# Patient Record
Sex: FEMALE | Race: ASIAN | URBAN - METROPOLITAN AREA
[De-identification: names, ages, dates, MRNs, and addresses within clinical notes are randomized per-mention and may not be internally consistent; named-entity substitution may affect disease eponyms.]

---

## 2023-08-14 ENCOUNTER — OFFICE VISIT (OUTPATIENT)
Dept: INTERNAL MEDICINE CLINIC | Facility: CLINIC | Age: 33
End: 2023-08-14
Payer: MEDICAID

## 2023-08-14 VITALS
HEART RATE: 66 BPM | DIASTOLIC BLOOD PRESSURE: 64 MMHG | SYSTOLIC BLOOD PRESSURE: 102 MMHG | RESPIRATION RATE: 18 BRPM | OXYGEN SATURATION: 98 % | HEIGHT: 66.14 IN | TEMPERATURE: 97 F | WEIGHT: 177.38 LBS | BODY MASS INDEX: 28.51 KG/M2

## 2023-08-14 DIAGNOSIS — Z12.4 SCREENING FOR CERVICAL CANCER: ICD-10-CM

## 2023-08-14 DIAGNOSIS — Z13.0 SCREENING FOR DEFICIENCY ANEMIA: ICD-10-CM

## 2023-08-14 DIAGNOSIS — Z13.220 SCREENING FOR LIPID DISORDERS: ICD-10-CM

## 2023-08-14 DIAGNOSIS — Z13.228 SCREENING FOR METABOLIC DISORDER: ICD-10-CM

## 2023-08-14 DIAGNOSIS — B18.1 HEPATITIS B CARRIER (HCC): Primary | ICD-10-CM

## 2023-08-14 DIAGNOSIS — Z13.29 SCREENING FOR THYROID DISORDER: ICD-10-CM

## 2023-08-14 PROCEDURE — 99203 OFFICE O/P NEW LOW 30 MIN: CPT | Performed by: FAMILY MEDICINE

## 2023-08-14 PROCEDURE — 3078F DIAST BP <80 MM HG: CPT | Performed by: FAMILY MEDICINE

## 2023-08-14 PROCEDURE — 3008F BODY MASS INDEX DOCD: CPT | Performed by: FAMILY MEDICINE

## 2023-08-14 PROCEDURE — 3074F SYST BP LT 130 MM HG: CPT | Performed by: FAMILY MEDICINE

## 2023-08-15 ENCOUNTER — TELEPHONE (OUTPATIENT)
Dept: INTERNAL MEDICINE CLINIC | Facility: CLINIC | Age: 33
End: 2023-08-15

## 2023-09-01 ENCOUNTER — LAB ENCOUNTER (OUTPATIENT)
Dept: LAB | Age: 33
End: 2023-09-01
Attending: FAMILY MEDICINE
Payer: MEDICAID

## 2023-09-01 DIAGNOSIS — Z13.220 SCREENING FOR LIPID DISORDERS: ICD-10-CM

## 2023-09-01 DIAGNOSIS — Z13.228 SCREENING FOR METABOLIC DISORDER: ICD-10-CM

## 2023-09-01 DIAGNOSIS — Z13.0 SCREENING FOR DEFICIENCY ANEMIA: ICD-10-CM

## 2023-09-01 DIAGNOSIS — B18.1 HEPATITIS B CARRIER (HCC): ICD-10-CM

## 2023-09-01 DIAGNOSIS — Z13.29 SCREENING FOR THYROID DISORDER: ICD-10-CM

## 2023-09-01 LAB
ALBUMIN SERPL-MCNC: 4 G/DL (ref 3.4–5)
ALBUMIN/GLOB SERPL: 1.1 {RATIO} (ref 1–2)
ALP LIVER SERPL-CCNC: 50 U/L
ALT SERPL-CCNC: 33 U/L
ANION GAP SERPL CALC-SCNC: 8 MMOL/L (ref 0–18)
AST SERPL-CCNC: 15 U/L (ref 15–37)
BASOPHILS # BLD AUTO: 0.03 X10(3) UL (ref 0–0.2)
BASOPHILS NFR BLD AUTO: 0.4 %
BILIRUB SERPL-MCNC: 0.6 MG/DL (ref 0.1–2)
BUN BLD-MCNC: 11 MG/DL (ref 7–18)
CALCIUM BLD-MCNC: 9.1 MG/DL (ref 8.5–10.1)
CHLORIDE SERPL-SCNC: 109 MMOL/L (ref 98–112)
CHOLEST SERPL-MCNC: 148 MG/DL (ref ?–200)
CO2 SERPL-SCNC: 23 MMOL/L (ref 21–32)
CREAT BLD-MCNC: 0.73 MG/DL
EGFRCR SERPLBLD CKD-EPI 2021: 112 ML/MIN/1.73M2 (ref 60–?)
EOSINOPHIL # BLD AUTO: 0.18 X10(3) UL (ref 0–0.7)
EOSINOPHIL NFR BLD AUTO: 2.2 %
ERYTHROCYTE [DISTWIDTH] IN BLOOD BY AUTOMATED COUNT: 12.9 %
FASTING PATIENT LIPID ANSWER: YES
FASTING STATUS PATIENT QL REPORTED: YES
GLOBULIN PLAS-MCNC: 3.5 G/DL (ref 2.8–4.4)
GLUCOSE BLD-MCNC: 96 MG/DL (ref 70–99)
HBV CORE AB SERPL QL IA: REACTIVE
HBV CORE IGM SER QL: NONREACTIVE
HBV SURFACE AB SER QL: NONREACTIVE
HBV SURFACE AB SERPL IA-ACNC: <3.1 MIU/ML
HBV SURFACE AG SER-ACNC: >1000 [IU]/L
HBV SURFACE AG SERPL QL IA: REACTIVE
HCT VFR BLD AUTO: 42.3 %
HDLC SERPL-MCNC: 35 MG/DL (ref 40–59)
HGB BLD-MCNC: 13.6 G/DL
IMM GRANULOCYTES # BLD AUTO: 0.04 X10(3) UL (ref 0–1)
IMM GRANULOCYTES NFR BLD: 0.5 %
LDLC SERPL CALC-MCNC: 92 MG/DL (ref ?–100)
LYMPHOCYTES # BLD AUTO: 1.87 X10(3) UL (ref 1–4)
LYMPHOCYTES NFR BLD AUTO: 23.1 %
MCH RBC QN AUTO: 28.9 PG (ref 26–34)
MCHC RBC AUTO-ENTMCNC: 32.2 G/DL (ref 31–37)
MCV RBC AUTO: 90 FL
MONOCYTES # BLD AUTO: 0.47 X10(3) UL (ref 0.1–1)
MONOCYTES NFR BLD AUTO: 5.8 %
NEUTROPHILS # BLD AUTO: 5.51 X10 (3) UL (ref 1.5–7.7)
NEUTROPHILS # BLD AUTO: 5.51 X10(3) UL (ref 1.5–7.7)
NEUTROPHILS NFR BLD AUTO: 68 %
NONHDLC SERPL-MCNC: 113 MG/DL (ref ?–130)
OSMOLALITY SERPL CALC.SUM OF ELEC: 289 MOSM/KG (ref 275–295)
PLATELET # BLD AUTO: 288 10(3)UL (ref 150–450)
POTASSIUM SERPL-SCNC: 4 MMOL/L (ref 3.5–5.1)
PROT SERPL-MCNC: 7.5 G/DL (ref 6.4–8.2)
RBC # BLD AUTO: 4.7 X10(6)UL
SODIUM SERPL-SCNC: 140 MMOL/L (ref 136–145)
TRIGL SERPL-MCNC: 113 MG/DL (ref 30–149)
TSI SER-ACNC: 1.69 MIU/ML (ref 0.36–3.74)
VLDLC SERPL CALC-MCNC: 18 MG/DL (ref 0–30)
WBC # BLD AUTO: 8.1 X10(3) UL (ref 4–11)

## 2023-09-01 PROCEDURE — 80053 COMPREHEN METABOLIC PANEL: CPT

## 2023-09-01 PROCEDURE — 36415 COLL VENOUS BLD VENIPUNCTURE: CPT

## 2023-09-01 PROCEDURE — 85025 COMPLETE CBC W/AUTO DIFF WBC: CPT

## 2023-09-01 PROCEDURE — 87517 HEPATITIS B DNA QUANT: CPT

## 2023-09-01 PROCEDURE — 86704 HEP B CORE ANTIBODY TOTAL: CPT

## 2023-09-01 PROCEDURE — 86705 HEP B CORE ANTIBODY IGM: CPT

## 2023-09-01 PROCEDURE — 80061 LIPID PANEL: CPT

## 2023-09-01 PROCEDURE — 87340 HEPATITIS B SURFACE AG IA: CPT

## 2023-09-01 PROCEDURE — 84443 ASSAY THYROID STIM HORMONE: CPT

## 2023-09-01 PROCEDURE — 86706 HEP B SURFACE ANTIBODY: CPT

## 2023-09-03 LAB
HBV IU/ML  2: 990 IU/ML
LOG10 HBV IU/ML: 3 LOG10 IU/ML

## 2023-09-11 ENCOUNTER — OFFICE VISIT (OUTPATIENT)
Dept: INTERNAL MEDICINE CLINIC | Facility: CLINIC | Age: 33
End: 2023-09-11
Payer: MEDICAID

## 2023-09-11 VITALS
BODY MASS INDEX: 28.13 KG/M2 | SYSTOLIC BLOOD PRESSURE: 102 MMHG | DIASTOLIC BLOOD PRESSURE: 74 MMHG | TEMPERATURE: 98 F | WEIGHT: 175 LBS | HEIGHT: 66 IN | HEART RATE: 76 BPM

## 2023-09-11 DIAGNOSIS — Z00.00 WELLNESS EXAMINATION: Primary | ICD-10-CM

## 2023-09-11 DIAGNOSIS — Z12.4 SCREENING FOR CERVICAL CANCER: ICD-10-CM

## 2023-09-11 DIAGNOSIS — B18.1 HEPATITIS B CARRIER (HCC): ICD-10-CM

## 2023-09-11 PROCEDURE — 99395 PREV VISIT EST AGE 18-39: CPT | Performed by: FAMILY MEDICINE

## 2023-09-11 PROCEDURE — 3078F DIAST BP <80 MM HG: CPT | Performed by: FAMILY MEDICINE

## 2023-09-11 PROCEDURE — 90715 TDAP VACCINE 7 YRS/> IM: CPT | Performed by: FAMILY MEDICINE

## 2023-09-11 PROCEDURE — 3074F SYST BP LT 130 MM HG: CPT | Performed by: FAMILY MEDICINE

## 2023-09-11 PROCEDURE — 3008F BODY MASS INDEX DOCD: CPT | Performed by: FAMILY MEDICINE

## 2023-09-11 PROCEDURE — 90471 IMMUNIZATION ADMIN: CPT | Performed by: FAMILY MEDICINE

## 2023-10-30 ENCOUNTER — HOSPITAL ENCOUNTER (OUTPATIENT)
Dept: ULTRASOUND IMAGING | Facility: HOSPITAL | Age: 33
Discharge: HOME | End: 2023-10-30
Attending: FAMILY MEDICINE
Payer: MEDICAID

## 2023-10-30 DIAGNOSIS — B18.1 HEPATITIS B CARRIER (HCC): ICD-10-CM

## 2023-10-30 PROCEDURE — 76700 US EXAM ABDOM COMPLETE: CPT | Performed by: FAMILY MEDICINE

## 2023-11-08 ENCOUNTER — OFFICE VISIT (OUTPATIENT)
Dept: OBGYN CLINIC | Facility: CLINIC | Age: 33
End: 2023-11-08
Payer: MEDICAID

## 2023-11-08 ENCOUNTER — LAB ENCOUNTER (OUTPATIENT)
Dept: LAB | Age: 33
End: 2023-11-08
Attending: STUDENT IN AN ORGANIZED HEALTH CARE EDUCATION/TRAINING PROGRAM
Payer: MEDICAID

## 2023-11-08 VITALS
SYSTOLIC BLOOD PRESSURE: 102 MMHG | BODY MASS INDEX: 28 KG/M2 | DIASTOLIC BLOOD PRESSURE: 80 MMHG | HEART RATE: 85 BPM | WEIGHT: 173.38 LBS

## 2023-11-08 DIAGNOSIS — N93.9 ABNORMAL UTERINE BLEEDING (AUB): ICD-10-CM

## 2023-11-08 DIAGNOSIS — Z01.419 WELL WOMAN EXAM WITH ROUTINE GYNECOLOGICAL EXAM: ICD-10-CM

## 2023-11-08 DIAGNOSIS — Z12.4 CERVICAL CANCER SCREENING: ICD-10-CM

## 2023-11-08 DIAGNOSIS — N39.3 STRESS INCONTINENCE: ICD-10-CM

## 2023-11-08 DIAGNOSIS — N93.9 ABNORMAL UTERINE BLEEDING (AUB): Primary | ICD-10-CM

## 2023-11-08 LAB
BASOPHILS # BLD AUTO: 0.03 X10(3) UL (ref 0–0.2)
BASOPHILS NFR BLD AUTO: 0.5 %
EOSINOPHIL # BLD AUTO: 0.25 X10(3) UL (ref 0–0.7)
EOSINOPHIL NFR BLD AUTO: 4 %
ERYTHROCYTE [DISTWIDTH] IN BLOOD BY AUTOMATED COUNT: 12.5 %
HCT VFR BLD AUTO: 41.1 %
HGB BLD-MCNC: 13.7 G/DL
IMM GRANULOCYTES # BLD AUTO: 0.02 X10(3) UL (ref 0–1)
IMM GRANULOCYTES NFR BLD: 0.3 %
LYMPHOCYTES # BLD AUTO: 1.96 X10(3) UL (ref 1–4)
LYMPHOCYTES NFR BLD AUTO: 31.3 %
MCH RBC QN AUTO: 29 PG (ref 26–34)
MCHC RBC AUTO-ENTMCNC: 33.3 G/DL (ref 31–37)
MCV RBC AUTO: 87.1 FL
MONOCYTES # BLD AUTO: 0.55 X10(3) UL (ref 0.1–1)
MONOCYTES NFR BLD AUTO: 8.8 %
NEUTROPHILS # BLD AUTO: 3.46 X10 (3) UL (ref 1.5–7.7)
NEUTROPHILS # BLD AUTO: 3.46 X10(3) UL (ref 1.5–7.7)
NEUTROPHILS NFR BLD AUTO: 55.1 %
PLATELET # BLD AUTO: 271 10(3)UL (ref 150–450)
RBC # BLD AUTO: 4.72 X10(6)UL
TSI SER-ACNC: 1.18 MIU/ML (ref 0.36–3.74)
WBC # BLD AUTO: 6.3 X10(3) UL (ref 4–11)

## 2023-11-08 PROCEDURE — 88175 CYTOPATH C/V AUTO FLUID REDO: CPT | Performed by: STUDENT IN AN ORGANIZED HEALTH CARE EDUCATION/TRAINING PROGRAM

## 2023-11-08 PROCEDURE — 87491 CHLMYD TRACH DNA AMP PROBE: CPT | Performed by: STUDENT IN AN ORGANIZED HEALTH CARE EDUCATION/TRAINING PROGRAM

## 2023-11-08 PROCEDURE — 3074F SYST BP LT 130 MM HG: CPT | Performed by: STUDENT IN AN ORGANIZED HEALTH CARE EDUCATION/TRAINING PROGRAM

## 2023-11-08 PROCEDURE — 36415 COLL VENOUS BLD VENIPUNCTURE: CPT

## 2023-11-08 PROCEDURE — 87591 N.GONORRHOEAE DNA AMP PROB: CPT | Performed by: STUDENT IN AN ORGANIZED HEALTH CARE EDUCATION/TRAINING PROGRAM

## 2023-11-08 PROCEDURE — 85025 COMPLETE CBC W/AUTO DIFF WBC: CPT

## 2023-11-08 PROCEDURE — 3079F DIAST BP 80-89 MM HG: CPT | Performed by: STUDENT IN AN ORGANIZED HEALTH CARE EDUCATION/TRAINING PROGRAM

## 2023-11-08 PROCEDURE — 99385 PREV VISIT NEW AGE 18-39: CPT | Performed by: STUDENT IN AN ORGANIZED HEALTH CARE EDUCATION/TRAINING PROGRAM

## 2023-11-08 PROCEDURE — 87624 HPV HI-RISK TYP POOLED RSLT: CPT | Performed by: STUDENT IN AN ORGANIZED HEALTH CARE EDUCATION/TRAINING PROGRAM

## 2023-11-08 PROCEDURE — 84443 ASSAY THYROID STIM HORMONE: CPT

## 2023-11-08 NOTE — PATIENT INSTRUCTIONS
To schedule an appointment for your radiology test [facility] at [phone].

## 2023-11-09 LAB
C TRACH DNA SPEC QL NAA+PROBE: NEGATIVE
HPV I/H RISK 1 DNA SPEC QL NAA+PROBE: NEGATIVE
N GONORRHOEA DNA SPEC QL NAA+PROBE: NEGATIVE

## 2023-11-14 LAB
.: NORMAL
.: NORMAL

## 2023-11-29 ENCOUNTER — HOSPITAL ENCOUNTER (OUTPATIENT)
Dept: ULTRASOUND IMAGING | Age: 33
Discharge: HOME | End: 2023-11-29
Attending: STUDENT IN AN ORGANIZED HEALTH CARE EDUCATION/TRAINING PROGRAM
Payer: MEDICAID

## 2023-11-29 DIAGNOSIS — N93.9 ABNORMAL UTERINE BLEEDING (AUB): ICD-10-CM

## 2023-11-29 PROCEDURE — 76856 US EXAM PELVIC COMPLETE: CPT | Performed by: STUDENT IN AN ORGANIZED HEALTH CARE EDUCATION/TRAINING PROGRAM

## 2023-11-29 PROCEDURE — 76830 TRANSVAGINAL US NON-OB: CPT | Performed by: STUDENT IN AN ORGANIZED HEALTH CARE EDUCATION/TRAINING PROGRAM

## 2024-02-23 ENCOUNTER — OFFICE VISIT (OUTPATIENT)
Dept: FAMILY MEDICINE CLINIC | Facility: CLINIC | Age: 34
End: 2024-02-23
Payer: MEDICAID

## 2024-02-23 VITALS
HEIGHT: 66 IN | RESPIRATION RATE: 16 BRPM | HEART RATE: 86 BPM | OXYGEN SATURATION: 98 % | TEMPERATURE: 98 F | WEIGHT: 180 LBS | BODY MASS INDEX: 28.93 KG/M2 | SYSTOLIC BLOOD PRESSURE: 120 MMHG | DIASTOLIC BLOOD PRESSURE: 86 MMHG

## 2024-02-23 DIAGNOSIS — J02.0 STREP THROAT: Primary | ICD-10-CM

## 2024-02-23 LAB
CONTROL LINE PRESENT WITH A CLEAR BACKGROUND (YES/NO): YES YES/NO
STREP GRP A CUL-SCR: POSITIVE

## 2024-02-23 RX ORDER — SPIRONOLACTONE 50 MG/1
50 TABLET, FILM COATED ORAL 2 TIMES DAILY
COMMUNITY
Start: 2024-02-02

## 2024-02-23 RX ORDER — AMOXICILLIN 500 MG/1
500 TABLET, FILM COATED ORAL 2 TIMES DAILY
Qty: 20 TABLET | Refills: 0 | Status: SHIPPED | OUTPATIENT
Start: 2024-02-23 | End: 2024-03-04

## 2024-02-23 NOTE — PROGRESS NOTES
CHIEF COMPLAINT:     Chief Complaint   Patient presents with    Sore Throat     Entered by patient x few days plus this morning coughed up yellow mucous       HPI:     Meena Vasques is a 33 year old female presents to clinic with symptoms of sore throat. Patient has had for 3 days. Symptoms have worsened since onset.  Patient reports following associated symptoms:  body felt feverish, body aches, hurts to swallow.  Jhony chills, headache, stomach upset, rash, congestion, cough, ear pain.    Treating symptoms with: tylenol/motrin, last dose 630am.   Has no history of strep. + sick contacts at home -  with similar symptoms.     Current Outpatient Medications   Medication Sig Dispense Refill    spironolactone 50 MG Oral Tab Take 1 tablet (50 mg total) by mouth 2 (two) times daily.      amoxicillin 500 MG Oral Tab Take 1 tablet (500 mg total) by mouth 2 (two) times daily for 10 days. 20 tablet 0      History reviewed. No pertinent past medical history.   Social History:  Social History     Socioeconomic History    Marital status:    Tobacco Use    Smoking status: Never     Passive exposure: Never    Smokeless tobacco: Never   Vaping Use    Vaping Use: Never used   Substance and Sexual Activity    Alcohol use: Not Currently    Drug use: Never    Sexual activity: Yes     Partners: Male     Birth control/protection: Condom   Other Topics Concern    Caffeine Concern Yes    Exercise No    Seat Belt Yes    Special Diet Yes        REVIEW OF SYSTEMS:   GENERAL HEALTH:  See HPI  SKIN: denies any unusual skin lesions or rashes  HEENT: See HPI  RESPIRATORY: denies shortness of breath, or wheezing  CARDIOVASCULAR: denies chest pain, palpitations   GI: denies abdominal pain, vomiting, constipation and diarrhea. normal appetite  NEURO: denies dizziness or lightheadedness      EXAM:   /86   Pulse 86   Temp 98.3 °F (36.8 °C) (Oral)   Resp 16   Ht 5' 6\" (1.676 m)   Wt 180 lb (81.6 kg)   LMP 02/01/2024  (Approximate)   SpO2 98%   BMI 29.05 kg/m²     Physical Exam  Constitutional:       General: She is not in acute distress.     Appearance: Normal appearance.   HENT:      Head: Normocephalic and atraumatic.      Right Ear: Tympanic membrane and ear canal normal.      Left Ear: Tympanic membrane and ear canal normal.      Nose: Nose normal.      Mouth/Throat:      Mouth: Mucous membranes are moist.      Pharynx: Oropharynx is clear. Uvula midline. Posterior oropharyngeal erythema present. Oropharyngeal exudate: no uvular deviation.     Tonsils: No tonsillar exudate.   Eyes:      Extraocular Movements: Extraocular movements intact.      Conjunctiva/sclera: Conjunctivae normal.   Cardiovascular:      Rate and Rhythm: Normal rate and regular rhythm.      Heart sounds: Normal heart sounds. No murmur heard.  Pulmonary:      Effort: Pulmonary effort is normal.      Breath sounds: Normal breath sounds.   Musculoskeletal:      Cervical back: Normal range of motion and neck supple.   Lymphadenopathy:      Cervical: Cervical adenopathy (non-tender) present.      Right cervical: Superficial cervical adenopathy present. No posterior cervical adenopathy.     Left cervical: Superficial cervical adenopathy present. No posterior cervical adenopathy.   Skin:     General: Skin is warm and dry.      Findings: No rash.   Neurological:      Mental Status: She is alert.           Recent Results (from the past 24 hour(s))   Strep A Assay W/Optic    Collection Time: 02/23/24 10:19 AM   Result Value Ref Range    Strep Grp A Screen Positive Negative    Control Line Present with a clear background (yes/no) yes Yes/No    Kit Lot # [id number] Numeric    Kit Expiration Date 4/22/25 Date       ASSESSMENT AND PLAN:   ASSESSMENT:  Encounter Diagnosis   Name Primary?    Strep throat Yes       PLAN:   Comfort care as listed in patient instructions.   Medication as below.    Requested Prescriptions     Signed Prescriptions Disp Refills    amoxicillin  500 MG Oral Tab 20 tablet 0     Sig: Take 1 tablet (500 mg total) by mouth 2 (two) times daily for 10 days.       Risks, benefits, complications and side effects of meds discussed with patient.     Follow up in 3-5 days if not improving, condition worsens, or fever greater than or equal to 100.4 persists for 72 hours.  The patient/parent indicates understanding of these issues and agrees to the plan.    Patient Instructions   It is recommended to take probiotics while taking an antibiotic.    Examples include:  Yogurt - eat 4-8 oz twice daily.  Choose a product with the [employer] Y[employer]urt [employer]'s seal such as Dannon, Yoplait, or Activia.   Capsule/granule forms such as Florastor, Florajen (refrigerated), or Culturelle.      Take the probiotic at least 3-4 hours after taking the antibiotic.  Continue the probiotic for 1-2 weeks after completing the antibiotic.      Comfort measures explained and discussed:    OTC Tylenol/ibuprofen as needed.    Push fluids- warm or cool liquids, whichever is soothing for patient.     Avoid caffeine.    Do not share utensils or drinks with anyone.    Good handwashing.    Get plenty of rest.    Can use over the counter benzocaine such as Cepacol throat lozenges or Chloroseptic throat spray to soothe sore throat.    Warm salt water gargles 2-3 times daily for at least 3 days.      If strep test is results are positive:    Take the full course of your antibiotic even if you are feeling better.   You are considered to be contagious until you have been on antibiotics for 24 hours.   You can return to school and/or work once on antibiotics for 24 hours  Change tooth brush two days into therapy  Follow up in 3-5 days if not improving, condition worsens, or fever greater than or equal to 100.4 persists for 72 hours.  Follow up in 3-5 days if not improving, condition worsens, or fever greater than or equal to 100.4 persists for 72 hours.

## 2024-02-23 NOTE — PATIENT INSTRUCTIONS
It is recommended to take probiotics while taking an antibiotic.    Examples include:  Yogurt - eat 4-8 oz twice daily.  Choose a product with the [employer] Yogurt Association's seal such as Dannon, Yoplait, or Activia.   Capsule/granule forms such as Florastor, Florajen (refrigerated), or Culturelle.      Take the probiotic at least 3-4 hours after taking the antibiotic.  Continue the probiotic for 1-2 weeks after completing the antibiotic.      Comfort measures explained and discussed:    OTC Tylenol/ibuprofen as needed.    Push fluids- warm or cool liquids, whichever is soothing for patient.     Avoid caffeine.    Do not share utensils or drinks with anyone.    Good handwashing.    Get plenty of rest.    Can use over the counter benzocaine such as Cepacol throat lozenges or Chloroseptic throat spray to soothe sore throat.    Warm salt water gargles 2-3 times daily for at least 3 days.      If strep test is results are positive:    Take the full course of your antibiotic even if you are feeling better.   You are considered to be contagious until you have been on antibiotics for 24 hours.   You can return to school and/or work once on antibiotics for 24 hours  Change tooth brush two days into therapy  Follow up in 3-5 days if not improving, condition worsens, or fever greater than or equal to 100.4 persists for 72 hours.  Follow up in 3-5 days if not improving, condition worsens, or fever greater than or equal to 100.4 persists for 72 hours.

## 2024-03-04 ENCOUNTER — LAB ENCOUNTER (OUTPATIENT)
Dept: LAB | Age: 34
End: 2024-03-04
Attending: FAMILY MEDICINE
Payer: MEDICAID

## 2024-03-04 DIAGNOSIS — B18.1 HEPATITIS B CARRIER (HCC): ICD-10-CM

## 2024-03-04 LAB
ALBUMIN SERPL-MCNC: 4.5 G/DL (ref 3.4–5)
ALP LIVER SERPL-CCNC: 49 U/L
ALT SERPL-CCNC: 50 U/L
AST SERPL-CCNC: 24 U/L (ref 15–37)
BILIRUB DIRECT SERPL-MCNC: 0.1 MG/DL (ref 0–0.2)
BILIRUB SERPL-MCNC: 0.8 MG/DL (ref 0.1–2)
HBV CORE AB SERPL QL IA: REACTIVE
HBV CORE IGM SER QL: NONREACTIVE
HBV SURFACE AB SER QL: NONREACTIVE
HBV SURFACE AB SERPL IA-ACNC: <3.1 MIU/ML
HBV SURFACE AG SER-ACNC: >1000 [IU]/L
HBV SURFACE AG SERPL QL IA: REACTIVE
PROT SERPL-MCNC: 8 G/DL (ref 6.4–8.2)

## 2024-03-04 PROCEDURE — 86705 HEP B CORE ANTIBODY IGM: CPT

## 2024-03-04 PROCEDURE — 86706 HEP B SURFACE ANTIBODY: CPT

## 2024-03-04 PROCEDURE — 86704 HEP B CORE ANTIBODY TOTAL: CPT

## 2024-03-04 PROCEDURE — 87340 HEPATITIS B SURFACE AG IA: CPT

## 2024-03-04 PROCEDURE — 36415 COLL VENOUS BLD VENIPUNCTURE: CPT

## 2024-03-04 PROCEDURE — 87517 HEPATITIS B DNA QUANT: CPT

## 2024-03-04 PROCEDURE — 80076 HEPATIC FUNCTION PANEL: CPT

## 2024-03-11 LAB
HBV DNA DETECT, QN LOG: 2.83 {LOG_IU}/ML
HBV DNA DETECT, QN: 671 [IU]/ML

## 2024-03-28 ENCOUNTER — TELEMEDICINE (OUTPATIENT)
Dept: INTERNAL MEDICINE CLINIC | Facility: CLINIC | Age: 34
End: 2024-03-28
Payer: MEDICAID

## 2024-03-28 DIAGNOSIS — B18.1 HEPATITIS B CARRIER (HCC): Primary | ICD-10-CM

## 2024-03-28 PROCEDURE — 99213 OFFICE O/P EST LOW 20 MIN: CPT | Performed by: FAMILY MEDICINE

## 2024-03-28 NOTE — PROGRESS NOTES
Due to COVID-19 ACTION PLAN, the patient's office visit was converted to a video visit with informed patient consent.   Time Spent: 12 min    Subjective     HPI:   Meena Vasques is a 33 year old female who presents for follow-up. Completed hep B labs and overall stable. No new symptoms. Has not established with GI/hepatologist here yet.       REVIEW OF SYSTEMS:  GENERAL: No recent illness, feels well otherwise    Physical Exam:  Appears well on exam.     Assessment and Plan:  Meena Vasques is presenting for follow-up    Hepatitis B carrier (HCC)  Hep b serologies stable and consistent with inactive carrier/chronic hep b. Last US in Nov normal and LFT's normal. Referral placed for GI/hepatology for consultation regarding continued monitoring and additional testing needed.   - Gastro Referral - In Network      No follow-ups on file.    Talha Mclaughlin MD    Meena Vasques understands phone evaluation is not a substitute for face-to-face examination or emergency care. Patient advised to go to ER or call 911 for worsening symptoms or acute distress.     Please note that the following visit was completed using two-way, real-time interactive video communication.  This has been done in good tammie to provide continuity of care in the best interest of the provider-patient relationship, due to the on-going public health crisis/national emergency and because of restrictions of visitation.  There are limitations of this visit as no physical exam could be performed.  Every conscious effort was taken to allow for sufficient and adequate time.  This billing visit was spent on reviewing labs, medications, radiology tests and decision making.  Appropriate medical decision-making and tests are ordered as detailed in the plan of care above.

## 2024-05-14 ENCOUNTER — TELEPHONE (OUTPATIENT)
Facility: CLINIC | Age: 34
End: 2024-05-14

## 2024-05-14 NOTE — TELEPHONE ENCOUNTER
Spoke to patient    Offered her an appt for next week (open d/t a cancellation)  Pt was agreeable    Location, date and time confirmed    Your Appointments      Tuesday May 21, 2024 9:00 AM  Consult with PRASAD Mcadams  [facility] ([facility]) [address]  [facility] [id number]  [phone]

## 2024-05-14 NOTE — TELEPHONE ENCOUNTER
Patient states that she needs to schedule consult appointment sooner then first available. Patient states that she is a Hepatitis B Carrier. Patient declined appointment for tomorrow and she is not available 6/4/2024. Patient prefers a early morning appointment and next available is end of June and early July.

## 2024-05-16 NOTE — H&P
[facility] - Gastroenterology                                                                                                               Reason for consult: chronic hep b    Requesting physician or provider: Talha Mclaughlin MD    Chief Complaint   Patient presents with    Consult       HPI:   Meena Vasques is a 34 year old year-old female with active diagnoses including chronic hepatitis B. Prior medical/surgical history in note table.    she is here today for evaluation  #chronic hepatitis B   -hx of chronic Hep B from maternal fetal exposure during birth. Reports she learned of diagnosis as a teenager. Denies abdominal distention or jaundice    3/4/24 labs:  LFTs WNL  Hepatitis B surface antigen / reactive  Hepatitis B surface antibody / nonreactive  Hepatitis B core antibody / reactive  Hepatitis B DNA quant 671  Hepatitis B DNA quant log 2.83  Hepatitis B core antibody IgM / non-reactive    US liver October 2023: LIVER:  Normal size and echogenicity. No significant masses.     Patient denies symptoms of nausea, vomiting, dyspepsia, dysphagia, odynophagia, globus sensation, heartburn, hematemesis, abdominal pain, change in bowel habits, constipation, diarrhea, hematochezia, or melena. she denies recent change in appetite, fever or unintentional weight loss.      Last colonoscopy: none   Last EGD: none     NSAIDS: ibuprofen occasional   Tobacco: none   Alcohol: none   Marijuana: none   Illicit drugs: none     FH GI malignancy: maternal Uncle - stomach cancer  FH IBD: none     No history of adverse reaction to sedation  No NICOLLE  No anticoagulants/antiplatelet  No pacemaker/defibrillator    Wt Readings from Last 6 Encounters:   05/21/24 181 lb (82.1 kg)   02/23/24 180 lb (81.6 kg)   11/08/23 173 lb 6 oz (78.6 kg)   09/11/23 175 lb (79.4 kg)   08/14/23 177 lb 6.4 oz (80.5 kg)        History, Medications, Allergies, ROS:       Past Medical History:    Acne    Hepatitis B carrier (HCC)      History reviewed. No pertinent surgical history.   Family Hx:   Family History   Problem Relation Age of Onset    Other (hepatitis B) Mother     Other (hepatitis b) Maternal Grandmother     Cancer Maternal Uncle         stomach, diagnosed age 40      Social History:   Social History     Socioeconomic History    Marital status:    Tobacco Use    Smoking status: Never     Passive exposure: Never    Smokeless tobacco: Never   Vaping Use    Vaping status: Never Used   Substance and Sexual Activity    Alcohol use: Not Currently    Drug use: Never    Sexual activity: Yes     Partners: Male     Birth control/protection: Condom   Other Topics Concern    Caffeine Concern Yes    Exercise No    Seat Belt Yes    Special Diet Yes        Medications (Active prior to today's visit):  Current Outpatient Medications   Medication Sig Dispense Refill    spironolactone 50 MG Oral Tab Take 1 tablet (50 mg total) by mouth 2 (two) times daily.         Allergies:  No Known Allergies    ROS:   CONSTITUTIONAL: negative for fevers, chills, sweats  EYES Negative for scleral icterus or redness, and diplopia  HEENT: Negative for hoarseness  RESPIRATORY: Negative for cough and severe shortness of breath  CARDIOVASCULAR: Negative for crushing sub-sternal chest pain  GASTROINTESTINAL: See HPI  GENITOURINARY: Negative for dysuria  MUSCULOSKELETAL: Negative for arthralgias and myalgias  SKIN: Negative for jaundice, rash or pruritus  NEUROLOGICAL: Negative for dizziness and headaches  BEHAVIOR/PSYCH: Negative for psychotic behavior    PHYSICAL EXAM:   Height 5' 6\" (1.676 m), weight 181 lb (82.1 kg), last menstrual period 02/01/2024.    GEN: Alert, no acute distress, well-nourished   HEENT: anicteric sclera, neck supple, trachea midline, MMM, no palpable or tender neck or supraclavicular lymph nodes  CV: RRR, the extremities are warm and well perfused   LUNGS: No increased work  of breathing, CTAB  ABDOMEN: Soft, symmetrical, non-tender without distention or guarding. No scars or lesions. Aorta is without bruit or visible pulsation. Umbilicus is midline without herniation. Normoactive bowel sounds are present, No masses, hepatomegaly or splenomegaly noted.  MSK: No erythema, no warmth, no swelling of joints  SKIN: No jaundice, no erythema, no rashes, no lesions  HEMATOLOGIC: No bleeding, no bruising  NEURO: Alert and interactive, ALLEN  PSYCH: appropriate mood & affect    Labs/Imaging/Procedures:     Patient's pertinent labs and imaging were reviewed and discussed with patient today.        .  ASSESSMENT/PLAN:   Meena Vasques is a 34 year old year-old female with active diagnoses including chronic hepatitis B. Prior medical/surgical history in note table.    she is here today for evaluation  #chronic hepatitis B   -hx of chronic Hep B from maternal fetal exposure during birth. Reports she learned of diagnosis as a teenager. Denies abdominal distention or jaundice    3/4/24 labs:  LFTs WNL  Hepatitis B surface antigen / reactive  Hepatitis B surface antibody / nonreactive  Hepatitis B core antibody / reactive  Hepatitis B DNA quant 671  Hepatitis B DNA quant log 2.83  Hepatitis B core antibody IgM / non-reactive    US liver October 2023: LIVER:  Normal size and echogenicity. No significant masses.   -AFP check today, normal, <2.2    -will add on labs for hepatitis B (e) antigen & antibody, repeat ultrasound. Plan for follow up visit & LFTs in 6 months    Recommendations:  - call to schedule ultrasound    - follow up in 6 months       Orders This Visit:  Orders Placed This Encounter   Procedures    AFP, Tumor Marker, Serum       Meds This Visit:  Requested Prescriptions      No prescriptions requested or ordered in this encounter       Imaging & Referrals:  US LIVER (ONI=[id number])      PRASAD Mcadams    [facility]  5/21/2024        This note was partially prepared using  Dragon Medical voice recognition dictation software. As a result, errors may occur. When identified, these errors have been corrected. While every attempt is made to correct errors during dictation, discrepancies may still exist.

## 2024-05-21 ENCOUNTER — OFFICE VISIT (OUTPATIENT)
Facility: CLINIC | Age: 34
End: 2024-05-21

## 2024-05-21 ENCOUNTER — LAB ENCOUNTER (OUTPATIENT)
Dept: LAB | Facility: HOSPITAL | Age: 34
End: 2024-05-21
Attending: NURSE PRACTITIONER

## 2024-05-21 VITALS — WEIGHT: 181 LBS | HEIGHT: 66 IN | BODY MASS INDEX: 29.09 KG/M2

## 2024-05-21 DIAGNOSIS — B18.1 HEPATITIS B CARRIER (HCC): ICD-10-CM

## 2024-05-21 DIAGNOSIS — B18.1 CHRONIC HEPATITIS B (HCC): Primary | ICD-10-CM

## 2024-05-21 DIAGNOSIS — B18.1 CHRONIC HEPATITIS B (HCC): ICD-10-CM

## 2024-05-21 LAB — AFP-TM SERPL-MCNC: <2.2 NG/ML

## 2024-05-21 PROCEDURE — 86707 HEPATITIS BE ANTIBODY: CPT

## 2024-05-21 PROCEDURE — 99204 OFFICE O/P NEW MOD 45 MIN: CPT

## 2024-05-21 PROCEDURE — 82105 ALPHA-FETOPROTEIN SERUM: CPT

## 2024-05-21 PROCEDURE — 87350 HEPATITIS BE AG IA: CPT

## 2024-05-21 PROCEDURE — 36415 COLL VENOUS BLD VENIPUNCTURE: CPT

## 2024-05-23 LAB
HEP BE AB: POSITIVE
HEP BE AG: NEGATIVE

## 2024-06-26 ENCOUNTER — HOSPITAL ENCOUNTER (OUTPATIENT)
Dept: ULTRASOUND IMAGING | Age: 34
Discharge: HOME | End: 2024-06-26

## 2024-06-26 DIAGNOSIS — B18.1 CHRONIC HEPATITIS B (HCC): ICD-10-CM

## 2024-06-26 PROCEDURE — 76705 ECHO EXAM OF ABDOMEN: CPT

## 2024-06-27 ENCOUNTER — TELEPHONE (OUTPATIENT)
Facility: CLINIC | Age: 34
End: 2024-06-27

## 2024-06-27 DIAGNOSIS — B18.1 CHRONIC HEPATITIS B (HCC): Primary | ICD-10-CM

## 2024-07-18 ENCOUNTER — OFFICE VISIT (OUTPATIENT)
Dept: FAMILY MEDICINE CLINIC | Facility: CLINIC | Age: 34
End: 2024-07-18
Payer: MEDICAID

## 2024-07-18 ENCOUNTER — TELEPHONE (OUTPATIENT)
Dept: INTERNAL MEDICINE CLINIC | Facility: CLINIC | Age: 34
End: 2024-07-18

## 2024-07-18 VITALS
RESPIRATION RATE: 16 BRPM | TEMPERATURE: 98 F | DIASTOLIC BLOOD PRESSURE: 80 MMHG | OXYGEN SATURATION: 98 % | SYSTOLIC BLOOD PRESSURE: 126 MMHG | BODY MASS INDEX: 30 KG/M2 | WEIGHT: 184 LBS | HEART RATE: 74 BPM

## 2024-07-18 DIAGNOSIS — R21 FACIAL RASH: Primary | ICD-10-CM

## 2024-07-18 PROCEDURE — 99213 OFFICE O/P EST LOW 20 MIN: CPT

## 2024-07-18 NOTE — TELEPHONE ENCOUNTER
Called Patient and Patient stated that she is having very redness and dry for months on her face. Transferred to triage.

## 2024-07-18 NOTE — TELEPHONE ENCOUNTER
RN received call from pt. Per pt, she was seen in [facility] today for facial rash. She stated she has had this before and saw a dermatologist in [place], but they were unable to help her per pt. [facility] advised pt follow-up with PCP and also derm. PSR scheduled pt for soonest appointment for follow-up. Informed pt we will inform Dr. Mclaughlin and see if he would like to see her sooner/recs on derm. Also advised pt to check with insurance for list of covered providers for derm.     Dr. Mclaughlin, see as scheduled and refer to derm? Patient medicaid, do not believe Dr. Lemus takes medicaid. Any other recommendations?

## 2024-07-18 NOTE — PROGRESS NOTES
CHIEF COMPLAINT:     Chief Complaint   Patient presents with    Skin Problem        HPI:    Meena Vasques is a 34 year old female who presents for evaluation of a rash.  Per patient rash has been on and off for years. The rash is characterized by redness with dry areas around sides of mouth. The affected location(s) include T-zone extending to chin. Patient has seen a Dermatologist 2 months ago and was prescribed acne medication (Spironolactone) and topical creams for face.  Associated symptoms include: none reported.  Exposure: patient reports rash is worse after sun exposure and eating. Patient denies itching.      Current Outpatient Medications   Medication Sig Dispense Refill    spironolactone 50 MG Oral Tab Take 1 tablet (50 mg total) by mouth 2 (two) times daily. Taking off and on        Past Medical History:    Acne    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 No pertinent surgical history.   Family History   Problem Relation Age of Onset    Other (hepatitis B) Mother     Other (hepatitis b) Maternal Grandmother     Cancer Maternal Uncle         stomach, diagnosed age 40      Social History     Socioeconomic History    Marital status:    Tobacco Use    Smoking status: Never     Passive exposure: Never    Smokeless tobacco: Never   Vaping Use    Vaping status: Never Used   Substance and Sexual Activity    Alcohol use: Not Currently    Drug use: Never    Sexual activity: Yes     Partners: Male     Birth control/protection: Condom   Other Topics Concern    Caffeine Concern Yes    Exercise No    Seat Belt Yes    Special Diet Yes         REVIEW OF SYSTEMS:   GENERAL: feels well otherwise  SKIN: Per HPI.   HEENT: Denies rhinorrhea, edema of the lips or swelling of throat.  CARDIOVASCULAR: Denies chest pains or palpitations.  LUNGS: Denies shortness of breath with exertion or rest. No cough or wheezing.  LYMPH: Denies enlargement of the lymph nodes.    EXAM:    /80   Pulse 74   Temp 98.4 °F (36.9 °C) (Oral)   Resp 16   Wt 184 lb (83.5 kg)   LMP 06/26/2024 (Approximate)   SpO2 98%   BMI 29.70 kg/m²   Physical Exam  Constitutional:       General: She is not in acute distress.     Appearance: Normal appearance. She is not ill-appearing.   HENT:      Head: Normocephalic.      Nose: Nose normal.      Mouth/Throat:      Mouth: Mucous membranes are moist.   Eyes:      Conjunctiva/sclera: Conjunctivae normal.   Cardiovascular:      Rate and Rhythm: Normal rate.   Pulmonary:      Effort: Pulmonary effort is normal. No respiratory distress.   Musculoskeletal:         General: Normal range of motion.      Cervical back: Normal range of motion.   Skin:     General: Skin is warm and dry.      Capillary Refill: Capillary refill takes less than 2 seconds.      Findings: Erythema (T-zone extending to chin) and rash (coalescing macular rash with areas of dryness around sides of mouth) present.      Comments: **see attached photo**   Neurological:      General: No focal deficit present.      Mental Status: She is alert and oriented to person, place, and time.   Psychiatric:         Mood and Affect: Mood normal.           ASSESSMENT AND PLAN:   Meena Vasques is a 34 year old female who presents for evaluation of a rash. Findings are consistent with:    ASSESSMENT:  Encounter Diagnosis   Name Primary?    Facial rash Yes       PLAN: Education provided.  Questions answered.  Reassurance given. Advised patient to continue supportive care: maintain hydration and symptom management with medications/ creams prescribed by previous Dermatologist. Recommend patient to follow up with [facility] and PCP for further evaluation and treatment. Information provided to patient. The patient is asked to f/u with PCP if sx's persist or worsen. The patient indicates understanding of these issues and agrees to the plan.

## 2024-07-18 NOTE — TELEPHONE ENCOUNTER
Called patient to advise.  Phone number to [facility] provided.  She confirms understanding.

## 2024-07-29 ENCOUNTER — OFFICE VISIT (OUTPATIENT)
Dept: INTERNAL MEDICINE CLINIC | Facility: CLINIC | Age: 34
End: 2024-07-29
Payer: MEDICAID

## 2024-07-29 VITALS
DIASTOLIC BLOOD PRESSURE: 68 MMHG | HEART RATE: 72 BPM | BODY MASS INDEX: 29.98 KG/M2 | WEIGHT: 182.13 LBS | TEMPERATURE: 98 F | HEIGHT: 65.35 IN | SYSTOLIC BLOOD PRESSURE: 112 MMHG | OXYGEN SATURATION: 97 % | RESPIRATION RATE: 20 BRPM

## 2024-07-29 DIAGNOSIS — B18.1 HEPATITIS B CARRIER (HCC): ICD-10-CM

## 2024-07-29 DIAGNOSIS — R21 FACIAL RASH: Primary | ICD-10-CM

## 2024-07-29 PROCEDURE — 99214 OFFICE O/P EST MOD 30 MIN: CPT | Performed by: FAMILY MEDICINE

## 2024-07-29 NOTE — PROGRESS NOTES
Meena Vasques  5/9/1990    Chief Complaint   Patient presents with    Follow - Up     ND - Room 9 - F/u on rash on skin and peeling that come before her menstrual cycle        HPI:   Meena Vasques is a 34 year old female who presents for follow-up on a intermittent facial rash. The rash has been present and intermittent only during the few days preceding her menses and resolve after for the last year. She had seen a previous dermatologist without clear diagnosis. The rash is red and sometimes pruritic and she notices some skin peeling. Her menses are regular, LMP started around July 20th. She has upcoming evaluation with a new dermatologist this week.     Current Outpatient Medications   Medication Sig Dispense Refill    spironolactone 50 MG Oral Tab Take 1 tablet (50 mg total) by mouth 2 (two) times daily. Taking off and on        No Known Allergies   Past Medical History:    Acne    Hepatitis B carrier (HCC)    from maternal fetal exposure during birth      Patient Active Problem List   Diagnosis    Hepatitis B carrier (HCC)      History reviewed. No pertinent surgical history.   Family History   Problem Relation Age of Onset    Other (hepatitis B) Mother     Other (hepatitis b) Maternal Grandmother     Cancer Maternal Uncle         stomach, diagnosed age 40      Social History     Socioeconomic History    Marital status:    Tobacco Use    Smoking status: Never     Passive exposure: Never    Smokeless tobacco: Never   Vaping Use    Vaping status: Never Used   Substance and Sexual Activity    Alcohol use: Not Currently    Drug use: Never    Sexual activity: Yes     Partners: Male     Birth control/protection: Condom   Other Topics Concern    Caffeine Concern Yes    Exercise No    Seat Belt Yes    Special Diet Yes         REVIEW OF SYSTEMS:   GENERAL: feels well otherwise, no recent illness, no current rash    EXAM:   /68 (BP Location: Right arm, Patient Position: Sitting, Cuff Size: adult)   Pulse 72    Temp 98.2 °F (36.8 °C) (Temporal)   Resp 20   Ht 5' 5.35\" (1.66 m)   Wt 182 lb 2 oz (82.6 kg)   LMP 06/26/2024 (Approximate)   SpO2 97%   BMI 29.98 kg/m²   GENERAL: Well developed, well nourished,in no apparent distress  SKIN: No rash on exam today.   EYES: PERRLA, EOMI, conjunctiva are clear  HEENT: atraumatic, normocephalic,ears and throat are clear  NECK: supple,no adenopathy,no bruits  LUNGS: normal work of breathing  CARDIO: regular rate    ASSESSMENT AND PLAN:   Meena Vasques is a 34 year old female who presents for evaluation    Facial rash  Intermittent erythematous facial rash only present in the days preceding and through her monthly menses. No current rash. I reviewed the photos of her last rash that are in her chart. Possible progesterone hypersensitivity based on timing of rash? She has upcoming dermatology evaluation with I recommend she keep and continue use of skin moisturizer and sun screen.     Hepatitis B carrier (HCC)  Will schedule evaluation with Dr. Licona. Recent labs stable and US negative.       All questions were answered and the patient agrees with the plan.     Thank you,  Talha Mclaughlin MD

## 2024-08-01 ENCOUNTER — OFFICE VISIT (OUTPATIENT)
Dept: DERMATOLOGY CLINIC | Facility: CLINIC | Age: 34
End: 2024-08-01
Payer: MEDICAID

## 2024-08-01 DIAGNOSIS — L20.9 ATOPIC DERMATITIS, UNSPECIFIED TYPE: Primary | ICD-10-CM

## 2024-08-01 DIAGNOSIS — L70.0 ACNE VULGARIS: ICD-10-CM

## 2024-08-01 PROCEDURE — 99204 OFFICE O/P NEW MOD 45 MIN: CPT | Performed by: STUDENT IN AN ORGANIZED HEALTH CARE EDUCATION/TRAINING PROGRAM

## 2024-08-01 RX ORDER — NORGESTIMATE AND ETHINYL ESTRADIOL 0.25-0.035
1 KIT ORAL DAILY
Qty: 28 TABLET | Refills: 12 | Status: SHIPPED | OUTPATIENT
Start: 2024-08-01 | End: 2025-08-01

## 2024-08-01 RX ORDER — TACROLIMUS 1 MG/G
1 OINTMENT TOPICAL 2 TIMES DAILY
Qty: 60 G | Refills: 3 | Status: SHIPPED | OUTPATIENT
Start: 2024-08-01

## 2024-08-01 NOTE — PROGRESS NOTES
August 1, 2024    New patient     Referred by: Talha Mclaughlin MD      CHIEF COMPLAINT: Rash     HISTORY OF PRESENT ILLNESS: Meena Vasques is a 34 year old female here for evaluation of rash.    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   Skin as per HPI    Medications  Current Outpatient Medications   Medication Sig Dispense Refill    spironolactone 50 MG Oral Tab Take 1 tablet (50 mg total) by mouth 2 (two) times daily. Taking off and on         PHYSICAL EXAM:  General: awake, alert, no acute distress  Skin: Skin exam was performed today including the following: Face. Pertinent findings include:   -With mild erythematous patches today    ASSESSMENT & PLAN:  Pathophysiology of diagnoses discussed with patient.  Therapeutic options reviewed. Risks, benefits, and alternatives discussed with patient. Instructions reviewed at length.    #Acne vulgaris  - Recommended starting Sprintec today.  - Suggested trial of hormonal therapy with OCP PO daily. Explained that I would be happy to initiate therapy, however patient should see gynecologist or PCP within 6 months to take over prescribing. Explained potential risks including blood clots, migraines, and potential tie to hormonally-dependent breast cancer. Pt with no personal or family history of blood clots or breast cancer. No personal history of migraines.      #Eczematous dermatitis  - Recommended starting tacrolimus 0.1% twice daily to affected areas on face.  Risk, benefits, alternatives discussed.  Patient agreeable to proceed.      Return to clinic: 3 months or sooner if something concerning arises    James Ibarra MD

## 2024-08-12 ENCOUNTER — PATIENT MESSAGE (OUTPATIENT)
Dept: DERMATOLOGY CLINIC | Facility: CLINIC | Age: 34
End: 2024-08-12

## 2024-08-12 NOTE — TELEPHONE ENCOUNTER
From: Meena Vasques  To: James Ibarra  Sent: 8/12/2024 10:08 AM CDT  Subject: Birth control pills    Good morning Dr. Wood, I just got my prescription how to use this medicine? I asked the pharmacists they told me to ask my doctor. Thank you!

## 2024-08-12 NOTE — TELEPHONE ENCOUNTER
I always like for this medicine to be started on the last day of her period. IF she just recently finished her period ok to start today. If it is coming up soon I would wait.     James Ibarra MD

## 2024-08-15 ENCOUNTER — TELEPHONE (OUTPATIENT)
Dept: DERMATOLOGY CLINIC | Facility: CLINIC | Age: 34
End: 2024-08-15

## 2024-08-15 NOTE — TELEPHONE ENCOUNTER
Fax from [facility]    placed in pa inbox    Pa required for TACROLIMUS OINTMENT    Please call [phone] id [phone]

## 2024-08-16 NOTE — TELEPHONE ENCOUNTER
Medication PA Requested:      tacrolimus 0.1% oint                                                    CoverMyMeds Used:  Key:  Quantity: 60gm  Day Supply: 30  Sig: apply BID  DX Code:  L20.9                                   CPT code (if applicable):   Case Number/Pending Ref#:

## 2024-10-02 ENCOUNTER — HOSPITAL ENCOUNTER (OUTPATIENT)
Age: 34
Discharge: HOME | End: 2024-10-02
Payer: MEDICAID

## 2024-10-02 VITALS
RESPIRATION RATE: 18 BRPM | WEIGHT: 180 LBS | BODY MASS INDEX: 28.93 KG/M2 | HEART RATE: 76 BPM | TEMPERATURE: 99 F | SYSTOLIC BLOOD PRESSURE: 143 MMHG | HEIGHT: 66 IN | DIASTOLIC BLOOD PRESSURE: 91 MMHG | OXYGEN SATURATION: 97 %

## 2024-10-02 DIAGNOSIS — J20.9 ACUTE BRONCHITIS, UNSPECIFIED ORGANISM: Primary | ICD-10-CM

## 2024-10-02 PROCEDURE — 99213 OFFICE O/P EST LOW 20 MIN: CPT | Performed by: NURSE PRACTITIONER

## 2024-10-02 RX ORDER — BENZONATATE 200 MG/1
200 CAPSULE ORAL 3 TIMES DAILY PRN
Qty: 30 CAPSULE | Refills: 0 | Status: SHIPPED | OUTPATIENT
Start: 2024-10-02 | End: 2024-11-01

## 2024-10-02 RX ORDER — PREDNISONE 20 MG/1
40 TABLET ORAL DAILY
Qty: 10 TABLET | Refills: 0 | Status: SHIPPED | OUTPATIENT
Start: 2024-10-02 | End: 2024-10-07

## 2024-10-02 NOTE — DISCHARGE INSTRUCTIONS
Rest and drink plenty of fluids.    This will help to thin the mucous in the back of your throat.   Take Tylenol and/or ibuprofen as needed for pain or fever.   Use Zyrtec, Claritin, or Allegra to help with nasal drainage.  You can take this up to twice a day.   Start the prednisone and make sure to take this with food as it can upset your stomach.   Use the Tessalon Perles as prescribed for cough.  However, this can make you drowsy.   Take Robitussin or Delsym as needed for cough.   Follow up with your PCP in 5-7 days.     Your symptoms should improve in the next 7-10 days; however, the cough can linger for much longer.     Thank you for choosing [facility] for your care.

## 2024-10-02 NOTE — ED PROVIDER NOTES
Patient Seen in: [facility]      History     Chief Complaint   Patient presents with    Cough     Entered by patient     Stated Complaint: Cough    Subjective:   34-year-old female presents to the immediate care with complaint of cough.  Patient states for the last 9 days she has been having a runny nose, headache, and cough.  She states the cough is primarily nonproductive and has become worse at night.  She has been taking multisymptom cold and flu medicine without much relief.  She states her daughter recently started with similar symptoms and was tested for COVID and flu and was negative.  She denies any fever, chills, ear pain, ear drainage, sore throat, difficulty swallowing, voice changes, shortness of breath, or chest pain.    The history is provided by the patient.         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 No pertinent surgical history.             Social History     Socioeconomic History    Marital status:    Tobacco Use    Smoking status: Never     Passive exposure: Never    Smokeless tobacco: Never   Vaping Use    Vaping status: Never Used   Substance and Sexual Activity    Alcohol use: Not Currently    Drug use: Never    Sexual activity: Yes     Partners: Male     Birth control/protection: Condom   Other Topics Concern    Caffeine Concern Yes    Exercise No    Seat Belt Yes    Special Diet Yes    Breast feeding No    Reaction to local anesthetic No    Pt has a pacemaker No    Pt has a defibrillator No              Physical Exam     ED Triage Vitals [10/02/24 1108]   BP (!) 143/91   Pulse 76   Resp 18   Temp 99.1 °F (37.3 °C)   Temp src Temporal   SpO2 97 %   O2 Device None (Room air)       Current Vitals:   Vital Signs  BP: (!) 143/91  Pulse: 76  Resp: 18  Temp: 99.1 °F (37.3 °C)  Temp src: Temporal    Oxygen Therapy  SpO2: 97 %  O2 Device: None (Room  air)        Physical Exam  Vitals and nursing note reviewed.   Constitutional:       General: She is not in acute distress.     Appearance: Normal appearance. She is normal weight. She is not ill-appearing.   HENT:      Head: Normocephalic and atraumatic.      Right Ear: Tympanic membrane, ear canal and external ear normal.      Left Ear: Tympanic membrane, ear canal and external ear normal.      Nose: Congestion present.      Mouth/Throat:      Mouth: Mucous membranes are moist.      Pharynx: Oropharynx is clear.   Eyes:      Conjunctiva/sclera: Conjunctivae normal.      Pupils: Pupils are equal, round, and reactive to light.   Cardiovascular:      Rate and Rhythm: Normal rate and regular rhythm.      Pulses: Normal pulses.      Heart sounds: Normal heart sounds.   Pulmonary:      Effort: Pulmonary effort is normal. No respiratory distress.      Breath sounds: Normal breath sounds.   Musculoskeletal:         General: Normal range of motion.   Skin:     General: Skin is warm and dry.   Neurological:      General: No focal deficit present.      Mental Status: She is alert and oriented to person, place, and time.   Psychiatric:         Mood and Affect: Mood normal.         Behavior: Behavior normal.           ED Course   Labs Reviewed - No data to display            MDM                    Medical Decision Making  34-year-old female with history and exam consistent with an acute bronchitis.  No evidence of sepsis, pneumonia or other cardiopulmonary pathology.  Do not feel that any imaging, labs, or antibiotics are warranted at this time.  Will treat with a short course of prednisone and Tessalon Perles.  Supportive management at home discussed with patient.  Patient has a good understanding that if anything changes or worsens to go to the ER for further evaluation.  Otherwise follow up with PCP in 1 week.    Risk  OTC drugs.  Prescription drug management.        Disposition and Plan     Clinical Impression:  1. Acute  bronchitis, unspecified organism         Disposition:  Discharge  10/2/2024 11:33 am    Follow-up:  Talha Mclaughlin MD  [address] [place] [address]  [phone]    In 1 week  As needed          Medications Prescribed:  Discharge Medication List as of 10/2/2024 11:34 AM        START taking these medications    Details   predniSONE 20 MG Oral Tab Take 2 tablets (40 mg total) by mouth daily for 5 days., Normal, Disp-10 tablet, R-0      benzonatate 200 MG Oral Cap Take 1 capsule (200 mg total) by mouth 3 (three) times daily as needed for cough., Normal, Disp-30 capsule, R-0                 Supplementary Documentation:

## 2024-11-21 ENCOUNTER — OFFICE VISIT (OUTPATIENT)
Facility: CLINIC | Age: 34
End: 2024-11-21
Payer: MEDICAID

## 2024-11-21 ENCOUNTER — OFFICE VISIT (OUTPATIENT)
Dept: DERMATOLOGY CLINIC | Facility: CLINIC | Age: 34
End: 2024-11-21
Payer: MEDICAID

## 2024-11-21 ENCOUNTER — LAB ENCOUNTER (OUTPATIENT)
Dept: LAB | Facility: HOSPITAL | Age: 34
End: 2024-11-21
Payer: MEDICAID

## 2024-11-21 ENCOUNTER — TELEPHONE (OUTPATIENT)
Dept: DERMATOLOGY CLINIC | Facility: CLINIC | Age: 34
End: 2024-11-21

## 2024-11-21 VITALS
HEART RATE: 94 BPM | HEIGHT: 66 IN | DIASTOLIC BLOOD PRESSURE: 89 MMHG | SYSTOLIC BLOOD PRESSURE: 126 MMHG | BODY MASS INDEX: 28.93 KG/M2 | WEIGHT: 180 LBS

## 2024-11-21 DIAGNOSIS — B18.1 CHRONIC HEPATITIS B (HCC): ICD-10-CM

## 2024-11-21 DIAGNOSIS — L20.9 ATOPIC DERMATITIS, UNSPECIFIED TYPE: Primary | ICD-10-CM

## 2024-11-21 DIAGNOSIS — B18.1 CHRONIC HEPATITIS B (HCC): Primary | ICD-10-CM

## 2024-11-21 DIAGNOSIS — L70.0 ACNE VULGARIS: ICD-10-CM

## 2024-11-21 DIAGNOSIS — L71.9 ROSACEA: ICD-10-CM

## 2024-11-21 LAB
AFP-TM SERPL-MCNC: <2.2 NG/ML
ALBUMIN SERPL-MCNC: 5 G/DL (ref 3.2–4.8)
ALP LIVER SERPL-CCNC: 46 U/L
ALT SERPL-CCNC: 27 U/L
AST SERPL-CCNC: 21 U/L (ref ?–34)
BILIRUB DIRECT SERPL-MCNC: 0.1 MG/DL (ref ?–0.3)
BILIRUB SERPL-MCNC: 0.4 MG/DL (ref 0.3–1.2)
PROT SERPL-MCNC: 8.1 G/DL (ref 5.7–8.2)

## 2024-11-21 PROCEDURE — 99214 OFFICE O/P EST MOD 30 MIN: CPT

## 2024-11-21 PROCEDURE — 80076 HEPATIC FUNCTION PANEL: CPT

## 2024-11-21 PROCEDURE — 82105 ALPHA-FETOPROTEIN SERUM: CPT

## 2024-11-21 PROCEDURE — 36415 COLL VENOUS BLD VENIPUNCTURE: CPT

## 2024-11-21 PROCEDURE — 99214 OFFICE O/P EST MOD 30 MIN: CPT | Performed by: STUDENT IN AN ORGANIZED HEALTH CARE EDUCATION/TRAINING PROGRAM

## 2024-11-21 RX ORDER — BRIMONIDINE 5 MG/G
GEL TOPICAL
Qty: 30 G | Refills: 6 | Status: SHIPPED | OUTPATIENT
Start: 2024-11-21

## 2024-11-21 RX ORDER — CETIRIZINE HYDROCHLORIDE 10 MG/1
10 TABLET ORAL 2 TIMES DAILY
Qty: 60 TABLET | Refills: 2 | Status: SHIPPED | OUTPATIENT
Start: 2024-11-21

## 2024-11-21 NOTE — PROGRESS NOTES
November 21, 2024    Established Patient     Referred by: Talha Mclaughlin MD      CHIEF COMPLAINT: Atopic Dermatitis & Acne F/U    HISTORY OF PRESENT ILLNESS: Meena Vasques is a 34 year old female here for follow up    Location: Face  Duration: On/off x 5 years   Signs and symptoms: Red, inflammed, hot to touch, burning dry, itchy- appears right before menstruation   Condition Update: No change - Pt did not start protopic- was not aware by pharmacy that PA was approved.   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   Skin as per HPI    Medications  Current Outpatient Medications   Medication Sig Dispense Refill    tacrolimus (PROTOPIC) 0.1 % External Ointment Apply 1 Application topically 2 (two) times daily. (Patient not taking: Reported on 11/21/2024) 60 g 3    Norgestimate-Eth Estradiol (SPRINTEC 28) 0.25-35 MG-MCG Oral Tab Take 1 tablet by mouth daily. 28 tablet 12       PHYSICAL EXAM:  General: awake, alert, no acute distress  Skin: Skin exam was performed today including the following: Face. Pertinent findings include:   -With mild erythematous patches today    ASSESSMENT & PLAN:  Pathophysiology of diagnoses discussed with patient.  Therapeutic options reviewed. Risks, benefits, and alternatives discussed with patient. Instructions reviewed at length.    #Acne vulgaris  - Recommended starting Sprintec today.  - Suggested trial of hormonal therapy with OCP PO daily. Explained that I would be happy to initiate therapy, however patient should see gynecologist or  PCP within 6 months to take over prescribing. Explained potential risks including blood clots, migraines, and potential tie to hormonally-dependent breast cancer. Pt with no personal or family history of blood clots or breast cancer. No personal history of migraines.   - Start Mirvaso once daily for redness    #Eczematous dermatitis  - Recommended starting tacrolimus 0.1% twice daily to affected areas on face.  Risk, benefits, alternatives discussed.  Patient agreeable to proceed.      Return to clinic: 3 months or sooner if something concerning arises    James Ibarra MD

## 2024-11-21 NOTE — PATIENT INSTRUCTIONS
Hepatology:  Dr. Licona hepatology  Phone: [phone]    Or     Dr. Skelton hepatology   Phone: [phone]    Or    Dr. Gracia hepatology  #[phone]    *or can contact your insurance for hepatologist in your network near [place]    -------------------------------------------------------------------------------------------------    -go to lab for blood work    -call to schedule ultrasound liver #[phone]

## 2024-11-21 NOTE — PROGRESS NOTES
[facility] - Gastroenterology                                                                                                               Reason for consult: chronic hep b    Requesting physician or provider: Talha Mclaughlin MD    Chief Complaint   Patient presents with    Follow - Up       HPI:   Meena Vasqeus is a 34 year old year-old female with active diagnoses including chronic hepatitis B. Prior medical/surgical history in note table.    she is here today for evaluation  Today:  #chronic hepatitis B:  inactive carrier hepatitis B  -prior labs in May, normal LFTs and AFP, US liver in June \"Fatty infiltration of the liver suggested with a small focus of fat sparing near the gallbladder fossa measuring 1.8 cm. \"  -hepatology referral placed none June, has not made appt yet  ·Hepatitis B surface antigen / reactive  ·Hepatitis B surface antibody / nonreactive  ·Hepatitis B core antibody / reactive  ·Hepatitis B DNA quant 671  ·Hepatitis B DNA quant log 2.83  ·Hepatitis B core antibody IgM / non-reactive  5/21  AFP <2.2  Hep be Ab positive  Hep be antigen negative    Prior visit 5/21/24  #chronic hepatitis B   -hx of chronic Hep B from maternal fetal exposure during birth. Reports she learned of diagnosis as a teenager. Denies abdominal distention or jaundice    3/4/24 labs:  LFTs WNL  Hepatitis B surface antigen / reactive  Hepatitis B surface antibody / nonreactive  Hepatitis B core antibody / reactive  Hepatitis B DNA quant 671  Hepatitis B DNA quant log 2.83  Hepatitis B core antibody IgM / non-reactive    US liver October 2023: LIVER:  Normal size and echogenicity. No significant masses.     Patient denies symptoms of nausea, vomiting, dyspepsia, dysphagia, odynophagia, globus sensation, heartburn, hematemesis, abdominal pain, change in bowel habits, constipation, diarrhea, hematochezia, or melena. she denies recent  change in appetite, fever or unintentional weight loss.      Last colonoscopy: none   Last EGD: none     NSAIDS: ibuprofen occasional   Tobacco: none   Alcohol: none   Marijuana: none   Illicit drugs: none     FH GI malignancy: maternal Uncle - stomach cancer  FH IBD: none     No history of adverse reaction to sedation  No NICOLLE  No anticoagulants/antiplatelet  No pacemaker/defibrillator    Wt Readings from Last 6 Encounters:   11/21/24 180 lb (81.6 kg)   10/02/24 180 lb (81.6 kg)   07/29/24 182 lb 2 oz (82.6 kg)   07/18/24 184 lb (83.5 kg)   05/21/24 181 lb (82.1 kg)   02/23/24 180 lb (81.6 kg)        History, Medications, Allergies, ROS:      Past Medical History:    Acne    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 No pertinent surgical history.   Family Hx:   Family History   Problem Relation Age of Onset    Other (hepatitis B) Mother     Other (hepatitis b) Maternal Grandmother     Cancer Maternal Uncle         stomach, diagnosed age 40      Social History:   Social History     Socioeconomic History    Marital status:    Tobacco Use    Smoking status: Never     Passive exposure: Never    Smokeless tobacco: Never   Vaping Use    Vaping status: Never Used   Substance and Sexual Activity    Alcohol use: Not Currently    Drug use: Never    Sexual activity: Yes     Partners: Male     Birth control/protection: Condom   Other Topics Concern    Caffeine Concern Yes    Exercise No    Seat Belt Yes    Special Diet Yes    Breast feeding No    Reaction to local anesthetic No    Pt has a pacemaker No    Pt has a defibrillator No        Medications (Active prior to today's visit):  Current Outpatient Medications   Medication Sig Dispense Refill    Norgestimate-Eth Estradiol (SPRINTEC 28) 0.25-35 MG-MCG Oral Tab Take 1 tablet by mouth daily. 28 tablet 12    tacrolimus (PROTOPIC) 0.1 % External Ointment Apply 1 Application topically 2 (two) times daily. (Patient not  taking: Reported on 11/21/2024) 60 g 3       Allergies:  No Known Allergies    ROS:   CONSTITUTIONAL: negative for fevers, chills, sweats  EYES Negative for scleral icterus or redness, and diplopia  HEENT: Negative for hoarseness  RESPIRATORY: Negative for cough and severe shortness of breath  CARDIOVASCULAR: Negative for crushing sub-sternal chest pain  GASTROINTESTINAL: See HPI  GENITOURINARY: Negative for dysuria  MUSCULOSKELETAL: Negative for arthralgias and myalgias  SKIN: Negative for jaundice, rash or pruritus  NEUROLOGICAL: Negative for dizziness and headaches  BEHAVIOR/PSYCH: Negative for psychotic behavior    PHYSICAL EXAM:   Blood pressure 126/89, pulse 94, height 5' 6\" (1.676 m), weight 180 lb (81.6 kg), last menstrual period 09/19/2024.    GEN: Alert, no acute distress, well-nourished   HEENT: anicteric sclera, neck supple, trachea midline, MMM, no palpable or tender neck or supraclavicular lymph nodes  CV: RRR, the extremities are warm and well perfused   LUNGS: No increased work of breathing, CTAB  ABDOMEN: Soft, symmetrical, non-tender without distention or guarding. No scars or lesions. Aorta is without bruit or visible pulsation. Umbilicus is midline without herniation. Normoactive bowel sounds are present, No masses, hepatomegaly or splenomegaly noted.  MSK: No erythema, no warmth, no swelling of joints  SKIN: No jaundice, no erythema, no rashes, no lesions  HEMATOLOGIC: No bleeding, no bruising  NEURO: Alert and interactive, ALLEN  PSYCH: appropriate mood & affect    Labs/Imaging/Procedures:     Patient's pertinent labs and imaging were reviewed and discussed with patient today.        .  ASSESSMENT/PLAN:   Meena Vasques is a 34 year old year-old female with active diagnoses including chronic hepatitis B. Prior medical/surgical history in note table.    she is here today for evaluation  Today:  #chronic hepatitis B:  inactive carrier hepatitis B  -prior labs in May, normal LFTs and AFP, US liver in  June \"Fatty infiltration of the liver suggested with a small focus of fat sparing near the gallbladder fossa measuring 1.8 cm. \"  -hepatology referral placed none June, has not made appt yet  ·Hepatitis B surface antigen / reactive  ·Hepatitis B surface antibody / nonreactive  ·Hepatitis B core antibody / reactive  ·Hepatitis B DNA quant 671  ·Hepatitis B DNA quant log 2.83  ·Hepatitis B core antibody IgM / non-reactive  5/21  AFP <2.2  Hep be Ab positive  Hep be antigen negative    -Need US & AFP every 6 months b/c high risk for HCC. Ordered along with repeat LFTs. Advised to make hepatology follow up     Recommendations:  -follow up with hepatologist  Hepatology:  Dr. Licona hepatology  Phone: [phone]  Or   Dr. Skelton hepatology   Phone: [phone]  Or  Dr. Gracia hepatology  #[phone]    *or can contact your insurance for hepatologist in your network near [place]    -------------------------------------------------------------------------------------------------    -go to lab for blood work    -call to schedule ultrasound liver #[phone]      Orders This Visit:  Orders Placed This Encounter   Procedures    Hepatic Function Panel (7)    AFP, Tumor Marker, Serum       Meds This Visit:  Requested Prescriptions      No prescriptions requested or ordered in this encounter       Imaging & Referrals:  US LIVER (QDN=[id number])      PRASAD Mcadams    [facility]  11/21/2024        This note was partially prepared using Dragon Medical voice recognition dictation software. As a result, errors may occur. When identified, these errors have been corrected. While every attempt is made to correct errors during dictation, discrepancies may still exist.

## 2024-11-22 NOTE — TELEPHONE ENCOUNTER
Dr. Langston pt seen yesterday, do we attempt? Please route back with chart notes if so, thank you!

## 2024-11-25 NOTE — TELEPHONE ENCOUNTER
Fax received from [facility] - brimonidine approved through 11/21/25 - sent to scan

## 2024-12-30 ENCOUNTER — LAB ENCOUNTER (OUTPATIENT)
Dept: LAB | Age: 34
End: 2024-12-30
Attending: STUDENT IN AN ORGANIZED HEALTH CARE EDUCATION/TRAINING PROGRAM
Payer: MEDICAID

## 2024-12-30 ENCOUNTER — TELEPHONE (OUTPATIENT)
Dept: DERMATOLOGY CLINIC | Facility: CLINIC | Age: 34
End: 2024-12-30

## 2024-12-30 ENCOUNTER — OFFICE VISIT (OUTPATIENT)
Dept: DERMATOLOGY CLINIC | Facility: CLINIC | Age: 34
End: 2024-12-30
Payer: MEDICAID

## 2024-12-30 DIAGNOSIS — R21 RASH AND OTHER NONSPECIFIC SKIN ERUPTION: Primary | ICD-10-CM

## 2024-12-30 DIAGNOSIS — R21 RASH AND OTHER NONSPECIFIC SKIN ERUPTION: ICD-10-CM

## 2024-12-30 PROCEDURE — 36415 COLL VENOUS BLD VENIPUNCTURE: CPT

## 2024-12-30 PROCEDURE — 86038 ANTINUCLEAR ANTIBODIES: CPT

## 2024-12-30 PROCEDURE — 99214 OFFICE O/P EST MOD 30 MIN: CPT | Performed by: STUDENT IN AN ORGANIZED HEALTH CARE EDUCATION/TRAINING PROGRAM

## 2024-12-30 RX ORDER — ROFLUMILAST 1.5 MG/G
1 CREAM TOPICAL DAILY
Qty: 60 G | Refills: 0 | Status: SHIPPED | OUTPATIENT
Start: 2024-12-30

## 2024-12-30 NOTE — TELEPHONE ENCOUNTER
Medication PA Requested: zoryve 0.15% cream                                                         CoverMyMeds Used:  Key:  Quantity: 60gm  Day Supply: 30  Sig: apply 1x daily  DX Code:   L20.9                                  CPT code (if applicable):   Case Number/Pending Ref#:

## 2024-12-30 NOTE — TELEPHONE ENCOUNTER
Fax from [facility]    Placed in pa inbox    Plan does not cover zoryve cream     [phone] id [id number]

## 2024-12-30 NOTE — PROGRESS NOTES
Established patient     Referred by:   Talha Mclaughlin MD  [address] [facility] [address]  [place] [address]     CHIEF COMPLAINT: Acne    HISTORY OF PRESENT ILLNESS: .    1. Acne  Location: face   Duration: over 1 year  Bleeding, growing, changing?: Yes: has seen improvement with acne but redness remains  Scaly?:No    Itchy?:No    Current treatment: mirvaso gel and SPRINTEC   Past treatments: spironolactone    2. dermatitis  Location: face   Duration: over 1 year  Bleeding, growing, changing?: Yes: itchiness improved but redness remains  Scaly?:No    Itchy?:No    Current treatment: protopic and zyrtec  Past treatments: none        DERM HISTORY:  History of skin cancer: No  History of chronic skin disease/condition: No    FAMILY HISTORY:  History of melanoma: No    History/Other:    REVIEW OF SYSTEMS:  Constitutional: Denies fever, chills, unintentional weight loss.   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 60 tablet 2    tacrolimus (PROTOPIC) 0.1 % External Ointment Apply 1 Application topically 2 (two) times daily. (Patient not taking: Reported on 10/2/2024) 60 g 3    Norgestimate-Eth Estradiol (SPRINTEC 28) 0.25-35 MG-MCG Oral Tab Take 1 tablet by mouth daily. 28 tablet 12       Objective:    PHYSICAL EXAM:  General: awake, alert, no acute distress  Skin: Skin exam was performed today including the following: face. Pertinent findings include:   - with well defined area of erythema, improved from prior     ASSESSMENT & PLAN:  Pathophysiology of diagnoses discussed with patient.  Therapeutic options reviewed. Risks, benefits, and  alternatives discussed with patient. Instructions reviewed at length.    #Atopic dermatitis, 5% with primary involvement of face  - Start zoryve 0.15%. Once covered by insurance ok to stop tacrolimus    #Rosacea/acneiform dermatitis  - Continue OCP  - Continue mirvaso       Return to clinic: 3 months or sooner if something concerning arises     James Ibarra MD

## 2025-01-06 LAB — NUCLEAR IGG TITR SER IF: NEGATIVE {TITER}

## 2025-01-08 ENCOUNTER — PATIENT MESSAGE (OUTPATIENT)
Dept: DERMATOLOGY CLINIC | Facility: CLINIC | Age: 35
End: 2025-01-08

## 2025-01-08 NOTE — TELEPHONE ENCOUNTER
Dr. Ibarra,    Please see below picture and message from pt regarding reaction using Brimonidine Gel for   Rosacea/acneiform dermatitis. After speaking with the patient she started using the brimonidine gel on 12/30 and since then it's been irritated, redness present and burning. Pt has stopped using the gel since this morning.     Is there something else that can be sent?    Thank You!

## 2025-01-17 ENCOUNTER — TELEPHONE (OUTPATIENT)
Facility: CLINIC | Age: 35
End: 2025-01-17

## 2025-01-17 NOTE — TELEPHONE ENCOUNTER
1st,overdue reminder letter mailed out to patient    Imaging order    US LIVER (CPT=76705) (Order #[id number]) on 11/21/24

## 2025-02-03 ENCOUNTER — OFFICE VISIT (OUTPATIENT)
Dept: DERMATOLOGY CLINIC | Facility: CLINIC | Age: 35
End: 2025-02-03
Payer: MEDICAID

## 2025-02-03 DIAGNOSIS — L20.9 ATOPIC DERMATITIS, UNSPECIFIED TYPE: Primary | ICD-10-CM

## 2025-02-03 PROCEDURE — 99213 OFFICE O/P EST LOW 20 MIN: CPT | Performed by: STUDENT IN AN ORGANIZED HEALTH CARE EDUCATION/TRAINING PROGRAM

## 2025-02-03 NOTE — PROGRESS NOTES
Established patient     Referred by:   Talha Mclaughlin MD  [address] [address]     CHIEF COMPLAINT: F/U     HISTORY OF PRESENT ILLNESS: .      1.  Rosacea/acneiform dermatitis  Location: face   Duration: over 1 year  Bleeding, growing, changing?: EUCRISA has provided improvement   Scaly?:No    Itchy?:No    Current treatment: EUCRISA  Past treatments: protopic and zyrtec,  spironolactone, mirvaso gel and SPRINTEC         DERM HISTORY:  History of skin cancer: No  History of chronic skin disease/condition: No    FAMILY HISTORY:  History of melanoma: No    History/Other:    REVIEW OF SYSTEMS:  Constitutional: Denies fever, chills, unintentional weight loss.   Skin as per HPI    PAST MEDICAL HISTORY:  Past Medical History:    Acne    Hepatitis B carrier (HCC)    from maternal fetal exposure during birth       Medications  Current Outpatient Medications   Medication Sig Dispense Refill    Crisaborole (EUCRISA) 2 % External Ointment Apply to affected area twice daily 60 g 0    Roflumilast (ZORYVE) 0.15 % External Cream Apply 1 Application topically daily. 60 g 0    Brimonidine Tartrate (MIRVASO) 0.33 % External Gel Use daily for redness 30 g 6    cetirizine 10 MG Oral Tab Take 1 tablet (10 mg total) by mouth in the morning and 1 tablet (10 mg total) before bedtime. 60 tablet 2    tacrolimus (PROTOPIC) 0.1 % External Ointment Apply 1 Application topically 2 (two) times daily. (Patient not taking: Reported on 12/30/2024) 60 g 3    Norgestimate-Eth Estradiol (SPRINTEC 28) 0.25-35 MG-MCG Oral Tab Take 1 tablet by mouth daily. 28 tablet 12       Objective:    PHYSICAL EXAM:  General: awake, alert, no acute distress  Skin: Skin exam was performed today including the following: face. Pertinent findings include:   - Mid face mild erythema, Improved from prior    ASSESSMENT & PLAN:  Pathophysiology of diagnoses discussed with patient.  Therapeutic options reviewed. Risks, benefits, and alternatives  discussed with patient. Instructions reviewed at length.    #Atopic dermatitis, 5% with primary involvement of face  - Continue Eucrisa 2% ointment  - Recommended Mineral sunscreen     -- In reserve patch testing     Return to clinic: 2 months or sooner if something concerning arises     James Ibarra MD    By signing my name below, I, Liam CAMPOVERDE MA,  attest that this documentation has been prepared under the direction and in the presence of James Ibarra MD.   Electronically Signed: Liam CAMPOVERDE MA, 2/3/2025, 9:18 AM.    I, James Ibarra MD,  personally performed the services described in this documentation. All medical record entries made by the scribe were at my direction and in my presence.  I have reviewed the chart and agree that the record reflects my personal performance and is accurate and complete.  James Ibarra MD, 2/3/2025, 9:20 AM

## 2025-02-09 DIAGNOSIS — L25.9 CONTACT DERMATITIS, UNSPECIFIED CONTACT DERMATITIS TYPE, UNSPECIFIED TRIGGER: ICD-10-CM

## 2025-02-10 RX ORDER — CRISABOROLE 20 MG/G
OINTMENT TOPICAL
Qty: 60 G | Refills: 5 | Status: SHIPPED | OUTPATIENT
Start: 2025-02-10

## 2025-02-10 NOTE — TELEPHONE ENCOUNTER
Refill Request for medication(s):     Last Office Visit: 2/3/2025    Last Refill: 01/13/2025    Pharmacy, Dosage verified: yes    Condition Update (if applicable):     Rx pended and sent to provider for approval, please advise. Thank You!

## 2025-03-03 ENCOUNTER — PATIENT MESSAGE (OUTPATIENT)
Facility: CLINIC | Age: 35
End: 2025-03-03

## 2025-03-04 NOTE — TELEPHONE ENCOUNTER
Faxed referral to [facility] hepatology/ Dr Licona at [phone] and [phone] [facility] fax confirmation received at 5:48 pm.

## 2025-03-20 ENCOUNTER — HOSPITAL ENCOUNTER (OUTPATIENT)
Dept: ULTRASOUND IMAGING | Age: 35
Discharge: HOME | End: 2025-03-20
Payer: MEDICAID

## 2025-03-20 DIAGNOSIS — B18.1 CHRONIC HEPATITIS B (HCC): ICD-10-CM

## 2025-03-20 PROCEDURE — 76705 ECHO EXAM OF ABDOMEN: CPT

## 2025-03-27 ENCOUNTER — OFFICE VISIT (OUTPATIENT)
Dept: FAMILY MEDICINE CLINIC | Facility: CLINIC | Age: 35
End: 2025-03-27
Payer: MEDICAID

## 2025-03-27 VITALS
TEMPERATURE: 98 F | BODY MASS INDEX: 22 KG/M2 | RESPIRATION RATE: 16 BRPM | WEIGHT: 134 LBS | DIASTOLIC BLOOD PRESSURE: 76 MMHG | OXYGEN SATURATION: 99 % | HEART RATE: 78 BPM | SYSTOLIC BLOOD PRESSURE: 122 MMHG

## 2025-03-27 DIAGNOSIS — B34.9 NONSPECIFIC SYNDROME SUGGESTIVE OF VIRAL ILLNESS: ICD-10-CM

## 2025-03-27 DIAGNOSIS — Z20.828 EXPOSURE TO INFLUENZA: ICD-10-CM

## 2025-03-27 DIAGNOSIS — J02.9 SORE THROAT: Primary | ICD-10-CM

## 2025-03-27 DIAGNOSIS — Z20.818 EXPOSURE TO STREP THROAT: ICD-10-CM

## 2025-03-27 LAB
CONTROL LINE PRESENT WITH A CLEAR BACKGROUND (YES/NO): YES YES/NO
STREP GRP A CUL-SCR: NEGATIVE

## 2025-03-27 PROCEDURE — 87880 STREP A ASSAY W/OPTIC: CPT | Performed by: NURSE PRACTITIONER

## 2025-03-27 PROCEDURE — 99213 OFFICE O/P EST LOW 20 MIN: CPT | Performed by: NURSE PRACTITIONER

## 2025-03-27 PROCEDURE — 87637 SARSCOV2&INF A&B&RSV AMP PRB: CPT | Performed by: NURSE PRACTITIONER

## 2025-03-27 PROCEDURE — 87081 CULTURE SCREEN ONLY: CPT | Performed by: NURSE PRACTITIONER

## 2025-03-27 RX ORDER — OSELTAMIVIR PHOSPHATE 75 MG/1
75 CAPSULE ORAL 2 TIMES DAILY
Qty: 10 CAPSULE | Refills: 0 | Status: SHIPPED | OUTPATIENT
Start: 2025-03-27 | End: 2025-04-01

## 2025-03-27 NOTE — PROGRESS NOTES
HPI:   Meena Vasques is a 34 year old female who presents with ill symptoms for  3  days. Patient reports sore throat, fatigue, tactile fever, cough.. Has tried cold/flu medication with not much relief. Child at home sick with positive strep and Influenza B.    Current Outpatient Medications   Medication Sig Dispense Refill    Crisaborole (EUCRISA) 2 % External Ointment APPLY TO THE AFFECTED AREA TWICE DAILY 60 g 5     No current facility-administered medications for this visit.      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   Family History   Problem Relation Age of Onset    Other (hepatitis B) Mother     Other (hepatitis b) Maternal Grandmother     Cancer Maternal Uncle         stomach, diagnosed age 40      Social History     Socioeconomic History    Marital status:    Tobacco Use    Smoking status: Never     Passive exposure: Never    Smokeless tobacco: Never   Vaping Use    Vaping status: Never Used   Substance and Sexual Activity    Alcohol use: Not Currently    Drug use: Never    Sexual activity: Yes     Partners: Male     Birth control/protection: Condom   Other Topics Concern    Caffeine Concern Yes    Exercise No    Seat Belt Yes    Special Diet Yes    Grew up on a farm No    History of tanning Yes    Outdoor occupation No    Breast feeding No    Reaction to local anesthetic No    Pt has a pacemaker No    Pt has a defibrillator No         REVIEW OF SYSTEMS:   GENERAL: feels well otherwise, fatigued  HEENT: congested, as above in HPI  LUNGS: denies shortness of breath with exertion  CARDIOVASCULAR: denies chest pain on exertion  GI: no nausea or abdominal pain, appetite down  NEURO: denies current headaches    EXAM:   /76   Pulse 78   Temp 98.1 °F (36.7 °C) (Oral)   Resp 16   Wt 134 lb (60.8 kg)   LMP 03/19/2025 (Approximate)   SpO2 99%   BMI 21.63 kg/m²   GENERAL: well developed, well  nourished,in no apparent distress  HEENT: atraumatic, normocephalic,ears clear, nares with minimal mucus, throat pink and clear. Uvuvla midline.   NECK: supple,no adenopathy  LUNGS: clear to auscultation all lobes, breathing is non labored  CARDIO: RRR without murmur  Results for orders placed or performed in visit on 03/27/25   Strep A Assay W/Optic    Collection Time: 03/27/25 10:48 AM   Result Value Ref Range    Strep Grp A Screen negative Negative    Control Line Present with a clear background (yes/no) yes Yes/No    Kit Lot # [id number] Numeric    Kit Expiration Date 12/20/25 Date         ASSESSMENT AND PLAN:    PLAN:Meena was seen today for flu.    Diagnoses and all orders for this visit:    Sore throat  -     SARS-CoV-2/Flu A and B/RSV by PCR ([facility]); Future  -     Strep A Assay W/Optic  -     Grp A Strep Cult, Throat  -     oseltamivir 75 MG Oral Cap; Take 1 capsule (75 mg total) by mouth 2 (two) times daily for 5 days.  -     SARS-CoV-2/Flu A and B/RSV by PCR ([facility])    Nonspecific syndrome suggestive of viral illness  -     SARS-CoV-2/Flu A and B/RSV by PCR ([facility]); Future  -     oseltamivir 75 MG Oral Cap; Take 1 capsule (75 mg total) by mouth 2 (two) times daily for 5 days.  -     SARS-CoV-2/Flu A and B/RSV by PCR ([facility])    Exposure to influenza  -     SARS-CoV-2/Flu A and B/RSV by PCR ([facility]); Future  -     oseltamivir 75 MG Oral Cap; Take 1 capsule (75 mg total) by mouth 2 (two) times daily for 5 days.  -     SARS-CoV-2/Flu A and B/RSV by PCR ([facility])    Exposure to strep throat  -     Grp A Strep Cult, Throat      Negative rapid strep. Throat culture and viral quad panel sent to lab. Tamiflu as below. Self care discussed. Medication use and risk/benefit discussed. Patient is advised to follow up with PCP if not improving with treatment plan or seek immediate care if symptoms worsen.  The patient indicates understanding of these issues and agrees to the plan.  Patient Instructions   Please  start Tamiflu twice daily by mouth today. If test result is positive for Influenza A or B continue as directed. If negative you may take the Tamiflu once daily until all ten doses are gone.  We will send a throat culture and prescribe antibiotics if needed. Test will take 48-72 hours to process after lab receives it today.  Salt water gargles (1 tsp. Salt in 6 oz lukewarm water), use several times daily to remove post nasal drainage and soothe throat.  Hydrate! (cold or hot based on comfort). Drink lots of water or other non dehydrating liquids to help with illness.   Hand washing-use hand  or wash hands frequently, cover your cough or sneeze, do not share towels or drinks with others.  May use Tylenol or Ibuprofen for pain/comfort if not contraindicated.  No work or school until fever free for 24 hours and respiratory symptoms are mostly improved.  Follow up with primary care in two weeks if symptoms not completely resolved post walk in visit, or seek immediate care if symptoms significantly worsen.

## 2025-03-27 NOTE — PATIENT INSTRUCTIONS
Please start Tamiflu twice daily by mouth today. If test result is positive for Influenza A or B continue as directed. If negative you may take the Tamiflu once daily until all ten doses are gone.  We will send a throat culture and prescribe antibiotics if needed. Test will take 48-72 hours to process after lab receives it today.  Salt water gargles (1 tsp. Salt in 6 oz lukewarm water), use several times daily to remove post nasal drainage and soothe throat.  Hydrate! (cold or hot based on comfort). Drink lots of water or other non dehydrating liquids to help with illness.   Hand washing-use hand  or wash hands frequently, cover your cough or sneeze, do not share towels or drinks with others.  May use Tylenol or Ibuprofen for pain/comfort if not contraindicated.  No work or school until fever free for 24 hours and respiratory symptoms are mostly improved.  Follow up with primary care in two weeks if symptoms not completely resolved post walk in visit, or seek immediate care if symptoms significantly worsen.

## 2025-03-28 LAB
FLUAV + FLUBV RNA SPEC NAA+PROBE: DETECTED
FLUAV + FLUBV RNA SPEC NAA+PROBE: NOT DETECTED
RSV RNA SPEC NAA+PROBE: NOT DETECTED
SARS-COV-2 RNA RESP QL NAA+PROBE: NOT DETECTED

## 2025-04-07 ENCOUNTER — OFFICE VISIT (OUTPATIENT)
Dept: DERMATOLOGY CLINIC | Facility: CLINIC | Age: 35
End: 2025-04-07
Payer: MEDICAID

## 2025-04-07 DIAGNOSIS — L20.9 ATOPIC DERMATITIS, UNSPECIFIED TYPE: Primary | ICD-10-CM

## 2025-04-07 DIAGNOSIS — L70.0 ACNE VULGARIS: ICD-10-CM

## 2025-04-07 DIAGNOSIS — L25.9 CONTACT DERMATITIS, UNSPECIFIED CONTACT DERMATITIS TYPE, UNSPECIFIED TRIGGER: ICD-10-CM

## 2025-04-07 PROCEDURE — 99214 OFFICE O/P EST MOD 30 MIN: CPT | Performed by: STUDENT IN AN ORGANIZED HEALTH CARE EDUCATION/TRAINING PROGRAM

## 2025-04-07 RX ORDER — DOXYCYCLINE HYCLATE 20 MG
20 TABLET ORAL 2 TIMES DAILY
Qty: 60 TABLET | Refills: 2 | Status: CANCELLED
Start: 2025-04-07

## 2025-04-07 RX ORDER — CRISABOROLE 20 MG/G
OINTMENT TOPICAL
Qty: 60 G | Refills: 5 | Status: SHIPPED | OUTPATIENT
Start: 2025-04-07

## 2025-04-07 RX ORDER — DOXYCYCLINE 100 MG/1
CAPSULE ORAL
Qty: 60 CAPSULE | Refills: 0 | Status: SHIPPED | OUTPATIENT
Start: 2025-04-07

## 2025-04-07 NOTE — PROGRESS NOTES
Established patient     Referred by:   Talha Mclaughlin MD  [address] [facility] [address]  [place] [address]     CHIEF COMPLAINT: F/U     HISTORY OF PRESENT ILLNESS: .      1.  Rosacea/acneiform dermatitis  Location: face   Duration: over 1 year  Bleeding, growing, changing?: improving with topical.    Scaly?:No    Itchy?:No    Current treatment: EUCRISA (zoyrve denied, try and fail eucrisa first)   Past treatments: protopic and zyrtec,  spironolactone, mirvaso gel and SPRINTEC         DERM HISTORY:  History of skin cancer: No  History of chronic skin disease/condition: No    FAMILY HISTORY:  History of melanoma: No    History/Other:    REVIEW OF SYSTEMS:  Constitutional: Denies fever, chills, unintentional weight loss.   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 60 g 0    Brimonidine Tartrate (MIRVASO) 0.33 % External Gel Use daily for redness 30 g 6    cetirizine 10 MG Oral Tab Take 1 tablet (10 mg total) by mouth in the morning and 1 tablet (10 mg total) before bedtime. 60 tablet 2    tacrolimus (PROTOPIC) 0.1 % External Ointment Apply 1 Application topically 2 (two) times daily. (Patient not taking: Reported on 12/30/2024) 60 g 3    Norgestimate-Eth Estradiol (SPRINTEC 28) 0.25-35 MG-MCG Oral Tab Take 1 tablet by mouth daily. 28 tablet 12       Objective:    PHYSICAL EXAM:  General: awake, alert, no acute distress  Skin: Skin exam was performed today including the following: face. Pertinent findings include:   - Mid face mild erythema, Improved from prior    ASSESSMENT & PLAN:  Pathophysiology of diagnoses discussed with patient.  Therapeutic options  reviewed. Risks, benefits, and alternatives discussed with patient. Instructions reviewed at length.    #Atopic dermatitis, 5% with primary involvement of face  - Continue Eucrisa 2% ointment  - Recommended Mineral sunscreen     -- In reserve patch testing     #Acne Vulgaris   - Prescribed doxycycline 100mg twice daily. Discussed potential side effects including GI upset and photosensitivity. Recommend taking with food, except milk products, calcium blocks the medication, so they should be avoided around the time of taking doxycycline. Instructed patient to take at least 30 minutes before lying down.    - Medications not safe in pregnancy/breastfeeding. Patient to stop and notify us if she becomes pregnant or plans to become pregnant.      Return to clinic: 6 weeks or sooner if something concerning arises     James Ibarra MD    By signing my name below, Liam BLAKELY MA,  attest that this documentation has been prepared under the direction and in the presence of James Ibarra MD.   Electronically Signed: Liam CAMPOVERDE MA, 4/7/2025, 9:15 AM.    IJames MD,  personally performed the services described in this documentation. All medical record entries made by the scribe were at my direction and in my presence.  I have reviewed the chart and agree that the record reflects my personal performance and is accurate and complete.  James Ibarra MD, 4/7/2025, 12:03 PM

## 2025-05-16 ENCOUNTER — TELEPHONE (OUTPATIENT)
Facility: CLINIC | Age: 35
End: 2025-05-16

## 2025-06-23 ENCOUNTER — OFFICE VISIT (OUTPATIENT)
Dept: DERMATOLOGY CLINIC | Facility: CLINIC | Age: 35
End: 2025-06-23
Payer: MEDICAID

## 2025-06-23 DIAGNOSIS — L70.0 ACNE VULGARIS: ICD-10-CM

## 2025-06-23 DIAGNOSIS — L20.9 ATOPIC DERMATITIS, UNSPECIFIED TYPE: Primary | ICD-10-CM

## 2025-06-23 PROCEDURE — 99214 OFFICE O/P EST MOD 30 MIN: CPT | Performed by: STUDENT IN AN ORGANIZED HEALTH CARE EDUCATION/TRAINING PROGRAM

## 2025-06-23 RX ORDER — ROFLUMILAST 1.5 MG/G
1 CREAM TOPICAL DAILY
Qty: 60 G | Refills: 11 | Status: SHIPPED | OUTPATIENT
Start: 2025-06-23

## 2025-06-23 NOTE — PROGRESS NOTES
Established patient     Referred by:   Talha Mclaughlin MD  [address] [facility] [address]  [place] [address]     CHIEF COMPLAINT: F/U     HISTORY OF PRESENT ILLNESS: .      1.  Rosacea/acneiform dermatitis  Location: face   Duration: over 1 year  Bleeding, growing, changing?: improving with topical.    Scaly?:No    Itchy?:No    Current treatment: EUCRISA (zoyrve denied,   Past treatments: protopic and zyrtec,  spironolactone, mirvaso gel and SPRINTEC         DERM HISTORY:  History of skin cancer: No  History of chronic skin disease/condition: No    FAMILY HISTORY:  History of melanoma: No    History/Other:    REVIEW OF SYSTEMS:  Constitutional: Denies fever, chills, unintentional weight loss.   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 Remain upright for 1 hour after taking medication. Avoid sun. (Ok to substitute with monohydrate and/or capsule if not covered) 60 capsule 0    Roflumilast (ZORYVE) 0.15 % External Cream Apply 1 Application topically daily. 60 g 0    Brimonidine Tartrate (MIRVASO) 0.33 % External Gel Use daily for redness 30 g 6    cetirizine 10 MG Oral Tab Take 1 tablet (10 mg total) by mouth in the morning and 1 tablet (10 mg total) before bedtime. 60 tablet 2    tacrolimus (PROTOPIC) 0.1 % External Ointment Apply 1 Application topically 2 (two) times daily. (Patient not taking: Reported on 12/30/2024) 60 g 3    Norgestimate-Eth Estradiol (SPRINTEC 28) 0.25-35 MG-MCG Oral Tab Take 1 tablet by mouth daily. 28 tablet 12       Objective:    PHYSICAL EXAM:  General: awake, alert, no acute distress  Skin: Skin exam was performed today including  the following: face. Pertinent findings include:   - Mid face mild erythema, Improved from prior    ASSESSMENT & PLAN:  Pathophysiology of diagnoses discussed with patient.  Therapeutic options reviewed. Risks, benefits, and alternatives discussed with patient. Instructions reviewed at length.    #Atopic dermatitis, 15% with primary involvement of face,  Failed Eucrisa. 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 Discussed potential side effects including GI upset and photosensitivity. Recommend taking with food, except milk products, calcium blocks the medication, so they should be avoided around the time of taking doxycycline. Instructed patient to take at least 30 minutes before lying down.    - Medications not safe in pregnancy/breastfeeding. Patient to stop and notify us if she becomes pregnant or plans to become pregnant.      Return to clinic: 6 weeks or sooner if something concerning arises     James Ibarra MD    By signing my name below, ILiam MA,  attest that this documentation has been prepared under the direction and in the presence of James Ibarra MD.   Electronically Signed: Liam CAMPOVERDE MA, 6/23/2025, 10:32 AM.    IJamse MD,  personally performed the services described in this documentation. All medical record entries made by the scribe were at my direction and in my presence.  I have reviewed the chart and agree that the record reflects my personal performance and is accurate and complete.  James Ibarra MD, 6/23/2025, 1:20 PM

## 2025-07-15 ENCOUNTER — TELEPHONE (OUTPATIENT)
Age: 35
End: 2025-07-15

## 2025-07-15 DIAGNOSIS — Z13.228 SCREENING FOR METABOLIC DISORDER: ICD-10-CM

## 2025-07-15 DIAGNOSIS — Z13.0 SCREENING FOR DEFICIENCY ANEMIA: ICD-10-CM

## 2025-07-15 DIAGNOSIS — Z13.220 SCREENING FOR LIPID DISORDERS: ICD-10-CM

## 2025-07-15 NOTE — TELEPHONE ENCOUNTER
Patient called request labs prior to their annual physical.  Annual physical scheduled for   Future Appointments   Date Time Provider Department Center   8/18/2025  3:00 PM Talha Mclaughlin MD [facility]        Please order labs. Patient preferred lab is [facility]  Patient informed request was sent to clinical team.  Patient informed to fast for labs.  No callback required.

## 2025-08-11 ENCOUNTER — LAB ENCOUNTER (OUTPATIENT)
Dept: LAB | Age: 35
End: 2025-08-11
Attending: FAMILY MEDICINE

## 2025-08-11 DIAGNOSIS — Z13.0 SCREENING FOR DEFICIENCY ANEMIA: ICD-10-CM

## 2025-08-11 DIAGNOSIS — Z13.228 SCREENING FOR METABOLIC DISORDER: ICD-10-CM

## 2025-08-11 DIAGNOSIS — Z13.220 SCREENING FOR LIPID DISORDERS: ICD-10-CM

## 2025-08-11 LAB
ALBUMIN SERPL-MCNC: 4.8 G/DL (ref 3.2–4.8)
ALBUMIN/GLOB SERPL: 1.7 (ref 1–2)
ALP LIVER SERPL-CCNC: 46 U/L (ref 37–98)
ALT SERPL-CCNC: 29 U/L (ref 10–49)
ANION GAP SERPL CALC-SCNC: 11 MMOL/L (ref 0–18)
AST SERPL-CCNC: 22 U/L (ref ?–34)
BASOPHILS # BLD AUTO: 0.03 X10(3) UL (ref 0–0.2)
BASOPHILS NFR BLD AUTO: 0.5 %
BILIRUB SERPL-MCNC: 1 MG/DL (ref 0.3–1.2)
BUN BLD-MCNC: 12 MG/DL (ref 9–23)
CALCIUM BLD-MCNC: 9.9 MG/DL (ref 8.7–10.6)
CHLORIDE SERPL-SCNC: 104 MMOL/L (ref 98–112)
CHOLEST SERPL-MCNC: 159 MG/DL (ref ?–200)
CO2 SERPL-SCNC: 24 MMOL/L (ref 21–32)
CREAT BLD-MCNC: 0.83 MG/DL (ref 0.55–1.02)
EGFRCR SERPLBLD CKD-EPI 2021: 95 ML/MIN/1.73M2 (ref 60–?)
EOSINOPHIL # BLD AUTO: 0.07 X10(3) UL (ref 0–0.7)
EOSINOPHIL NFR BLD AUTO: 1.1 %
ERYTHROCYTE [DISTWIDTH] IN BLOOD BY AUTOMATED COUNT: 12.5 %
FASTING PATIENT LIPID ANSWER: YES
FASTING STATUS PATIENT QL REPORTED: YES
GLOBULIN PLAS-MCNC: 2.8 G/DL (ref 2–3.5)
GLUCOSE BLD-MCNC: 98 MG/DL (ref 70–99)
HCT VFR BLD AUTO: 41.6 % (ref 35–48)
HDLC SERPL-MCNC: 32 MG/DL (ref 40–59)
HGB BLD-MCNC: 13.7 G/DL (ref 12–16)
IMM GRANULOCYTES # BLD AUTO: 0.02 X10(3) UL (ref 0–1)
IMM GRANULOCYTES NFR BLD: 0.3 %
LDLC SERPL CALC-MCNC: 103 MG/DL (ref ?–100)
LYMPHOCYTES # BLD AUTO: 2.09 X10(3) UL (ref 1–4)
LYMPHOCYTES NFR BLD AUTO: 33.7 %
MCH RBC QN AUTO: 29.5 PG (ref 26–34)
MCHC RBC AUTO-ENTMCNC: 32.9 G/DL (ref 31–37)
MCV RBC AUTO: 89.5 FL (ref 80–100)
MONOCYTES # BLD AUTO: 0.51 X10(3) UL (ref 0.1–1)
MONOCYTES NFR BLD AUTO: 8.2 %
NEUTROPHILS # BLD AUTO: 3.48 X10 (3) UL (ref 1.5–7.7)
NEUTROPHILS # BLD AUTO: 3.48 X10(3) UL (ref 1.5–7.7)
NEUTROPHILS NFR BLD AUTO: 56.2 %
NONHDLC SERPL-MCNC: 127 MG/DL (ref ?–130)
OSMOLALITY SERPL CALC.SUM OF ELEC: 288 MOSM/KG (ref 275–295)
PLATELET # BLD AUTO: 300 10(3)UL (ref 150–450)
POTASSIUM SERPL-SCNC: 4 MMOL/L (ref 3.5–5.1)
PROT SERPL-MCNC: 7.6 G/DL (ref 5.7–8.2)
RBC # BLD AUTO: 4.65 X10(6)UL (ref 3.8–5.3)
SODIUM SERPL-SCNC: 139 MMOL/L (ref 136–145)
TRIGL SERPL-MCNC: 131 MG/DL (ref 30–149)
VLDLC SERPL CALC-MCNC: 22 MG/DL (ref 0–30)
WBC # BLD AUTO: 6.2 X10(3) UL (ref 4–11)

## 2025-08-11 PROCEDURE — 80061 LIPID PANEL: CPT

## 2025-08-11 PROCEDURE — 80053 COMPREHEN METABOLIC PANEL: CPT

## 2025-08-11 PROCEDURE — 36415 COLL VENOUS BLD VENIPUNCTURE: CPT

## 2025-08-11 PROCEDURE — 85025 COMPLETE CBC W/AUTO DIFF WBC: CPT

## 2025-08-18 ENCOUNTER — OFFICE VISIT (OUTPATIENT)
Age: 35
End: 2025-08-18

## 2025-08-18 VITALS
OXYGEN SATURATION: 97 % | HEART RATE: 80 BPM | RESPIRATION RATE: 16 BRPM | SYSTOLIC BLOOD PRESSURE: 110 MMHG | DIASTOLIC BLOOD PRESSURE: 70 MMHG | HEIGHT: 66 IN | BODY MASS INDEX: 28.87 KG/M2 | WEIGHT: 179.63 LBS

## 2025-08-18 DIAGNOSIS — Z00.00 WELLNESS EXAMINATION: Primary | ICD-10-CM

## 2025-08-18 DIAGNOSIS — Z86.19 HISTORY OF HELICOBACTER PYLORI INFECTION: ICD-10-CM

## 2025-08-18 DIAGNOSIS — R19.6 HALITOSIS: ICD-10-CM

## 2025-08-18 DIAGNOSIS — B18.1 CHRONIC HEPATITIS B WITHOUT DELTA AGENT WITHOUT CIRRHOSIS (HCC): ICD-10-CM

## 2025-08-18 DIAGNOSIS — E66.3 OVERWEIGHT (BMI 25.0-29.9): ICD-10-CM

## 2025-08-18 DIAGNOSIS — K76.0 METABOLIC DYSFUNCTION-ASSOCIATED STEATOTIC LIVER DISEASE (MASLD): ICD-10-CM

## 2025-08-18 PROCEDURE — 99395 PREV VISIT EST AGE 18-39: CPT | Performed by: FAMILY MEDICINE

## (undated) NOTE — Clinical Note
Thank you for the kind referral. Please feel free to reach out with any questions.   James Ibarra MD

## (undated) NOTE — LETTER
1/17/2025          Meena Vasques    [address]    [place] [address]         Dear Meena,    Our records indicate that the tests ordered for you by PRASAD Mcadams  have not been done.  If you have, in fact, already completed the tests or you do not wish to have the tests done, please contact our office at THE NUMBER LISTED BELOW.  Otherwise, please proceed with the testing.  Enclosed is a duplicate order for your convenience.    Imaging Order:    US LIVER (CPT=76705) (Order #[id number]) on 11/21/24       To schedule a test at any [facility] [place] [facility] Facility, call Central Scheduling at [phone], Monday through Friday between 7:30am to 6pm and on Saturday between 8am and 1pm.   Evening and weekend appointments for your exam are available.       Sincerely,    PRASAD Mcadams  [facility], [facility]  [address] [facility] [address]  [facility] [place] [id number] [phone]